# Patient Record
Sex: FEMALE | Race: ASIAN | NOT HISPANIC OR LATINO | URBAN - METROPOLITAN AREA
[De-identification: names, ages, dates, MRNs, and addresses within clinical notes are randomized per-mention and may not be internally consistent; named-entity substitution may affect disease eponyms.]

---

## 2017-01-18 ENCOUNTER — OUTPATIENT (OUTPATIENT)
Dept: OUTPATIENT SERVICES | Facility: HOSPITAL | Age: 47
LOS: 1 days | Discharge: HOME | End: 2017-01-18

## 2017-01-18 VITALS
OXYGEN SATURATION: 99 % | HEIGHT: 62 IN | SYSTOLIC BLOOD PRESSURE: 129 MMHG | TEMPERATURE: 99 F | DIASTOLIC BLOOD PRESSURE: 66 MMHG | RESPIRATION RATE: 18 BRPM | WEIGHT: 159.61 LBS | HEART RATE: 83 BPM

## 2017-01-18 DIAGNOSIS — S83.242A OTHER TEAR OF MEDIAL MENISCUS, CURRENT INJURY, LEFT KNEE, INITIAL ENCOUNTER: ICD-10-CM

## 2017-01-18 DIAGNOSIS — Z98.51 TUBAL LIGATION STATUS: Chronic | ICD-10-CM

## 2017-01-18 DIAGNOSIS — Z01.818 ENCOUNTER FOR OTHER PREPROCEDURAL EXAMINATION: ICD-10-CM

## 2017-01-18 DIAGNOSIS — M25.569 PAIN IN UNSPECIFIED KNEE: ICD-10-CM

## 2017-01-18 LAB — HCG UR QL: NEGATIVE — SIGNIFICANT CHANGE UP

## 2017-01-18 NOTE — H&P PST ADULT - NSANTHOSAYNRD_GEN_A_CORE
No. JEF screening performed.  STOP BANG Legend: 0-2 = LOW Risk; 3-4 = INTERMEDIATE Risk; 5-8 = HIGH Risk

## 2017-01-18 NOTE — ASU PATIENT PROFILE, ADULT - VISION (WITH CORRECTIVE LENSES IF THE PATIENT USUALLY WEARS THEM):
Use glasses/Partially impaired: cannot see medication labels or newsprint, but can see obstacles in path, and the surrounding layout; can count fingers at arm's length

## 2017-01-19 ENCOUNTER — RESULT REVIEW (OUTPATIENT)
Age: 47
End: 2017-01-19

## 2017-01-20 ENCOUNTER — OUTPATIENT (OUTPATIENT)
Dept: OUTPATIENT SERVICES | Facility: HOSPITAL | Age: 47
LOS: 1 days | Discharge: HOME | End: 2017-01-20
Payer: MEDICAID

## 2017-01-20 VITALS
HEART RATE: 72 BPM | DIASTOLIC BLOOD PRESSURE: 58 MMHG | SYSTOLIC BLOOD PRESSURE: 101 MMHG | OXYGEN SATURATION: 99 % | RESPIRATION RATE: 16 BRPM

## 2017-01-20 VITALS
HEIGHT: 62 IN | WEIGHT: 160.06 LBS | DIASTOLIC BLOOD PRESSURE: 67 MMHG | HEART RATE: 72 BPM | OXYGEN SATURATION: 100 % | TEMPERATURE: 98 F | SYSTOLIC BLOOD PRESSURE: 102 MMHG | RESPIRATION RATE: 17 BRPM

## 2017-01-20 DIAGNOSIS — M65.862 OTHER SYNOVITIS AND TENOSYNOVITIS, LEFT LOWER LEG: ICD-10-CM

## 2017-01-20 DIAGNOSIS — M23.322 OTHER MENISCUS DERANGEMENTS, POSTERIOR HORN OF MEDIAL MENISCUS, LEFT KNEE: ICD-10-CM

## 2017-01-20 DIAGNOSIS — Z98.51 TUBAL LIGATION STATUS: Chronic | ICD-10-CM

## 2017-01-20 PROCEDURE — 88304 TISSUE EXAM BY PATHOLOGIST: CPT | Mod: 26

## 2017-01-20 RX ORDER — MORPHINE SULFATE 50 MG/1
4 CAPSULE, EXTENDED RELEASE ORAL ONCE
Qty: 0 | Refills: 0 | Status: DISCONTINUED | OUTPATIENT
Start: 2017-01-20 | End: 2017-01-20

## 2017-01-20 RX ORDER — SODIUM CHLORIDE 9 MG/ML
1000 INJECTION, SOLUTION INTRAVENOUS
Qty: 0 | Refills: 0 | Status: DISCONTINUED | OUTPATIENT
Start: 2017-01-20 | End: 2017-02-04

## 2017-01-20 RX ORDER — ACETAMINOPHEN 500 MG
1000 TABLET ORAL ONCE
Qty: 0 | Refills: 0 | Status: COMPLETED | OUTPATIENT
Start: 2017-01-20 | End: 2017-01-20

## 2017-01-20 RX ORDER — SODIUM CHLORIDE 9 MG/ML
3 INJECTION INTRAMUSCULAR; INTRAVENOUS; SUBCUTANEOUS EVERY 8 HOURS
Qty: 0 | Refills: 0 | Status: DISCONTINUED | OUTPATIENT
Start: 2017-01-20 | End: 2017-01-20

## 2017-01-20 RX ADMIN — MORPHINE SULFATE 4 MILLIGRAM(S): 50 CAPSULE, EXTENDED RELEASE ORAL at 11:15

## 2017-01-20 RX ADMIN — Medication 1000 MILLIGRAM(S): at 11:14

## 2017-01-20 RX ADMIN — MORPHINE SULFATE 4 MILLIGRAM(S): 50 CAPSULE, EXTENDED RELEASE ORAL at 11:13

## 2017-01-20 RX ADMIN — SODIUM CHLORIDE 75 MILLILITER(S): 9 INJECTION, SOLUTION INTRAVENOUS at 11:13

## 2017-01-20 RX ADMIN — Medication 400 MILLIGRAM(S): at 11:12

## 2017-01-20 NOTE — BRIEF OPERATIVE NOTE - PROCEDURE
Meniscectomy of left knee  01/20/2017  Left medical meniscectomy and synovectomy  Active  [place] [id number]

## 2017-01-20 NOTE — ASU DISCHARGE PLAN (ADULT/PEDIATRIC). - NOTIFY
Bleeding that does not stop/Numbness, color, or temperature change to extremity/Persistent Nausea and Vomiting/Unable to Urinate/Pain not relieved by Medications/Fever greater than 101/Inability to Tolerate Liquids or Foods

## 2017-01-23 LAB — SURGICAL PATHOLOGY FINAL REPORT - CH: SIGNIFICANT CHANGE UP

## 2018-12-10 ENCOUNTER — EMERGENCY (EMERGENCY)
Facility: HOSPITAL | Age: 48
LOS: 1 days | Discharge: HOME | End: 2018-12-10
Admitting: EMERGENCY MEDICINE
Payer: COMMERCIAL

## 2018-12-10 VITALS
DIASTOLIC BLOOD PRESSURE: 81 MMHG | RESPIRATION RATE: 16 BRPM | HEART RATE: 83 BPM | OXYGEN SATURATION: 100 % | TEMPERATURE: 98 F | SYSTOLIC BLOOD PRESSURE: 143 MMHG

## 2018-12-10 VITALS
OXYGEN SATURATION: 100 % | DIASTOLIC BLOOD PRESSURE: 88 MMHG | SYSTOLIC BLOOD PRESSURE: 144 MMHG | HEART RATE: 78 BPM | RESPIRATION RATE: 18 BRPM

## 2018-12-10 DIAGNOSIS — Z98.51 TUBAL LIGATION STATUS: Chronic | ICD-10-CM

## 2018-12-10 LAB
BLD GP AB SCN SERPL QL: NEGATIVE — SIGNIFICANT CHANGE UP
BUN SERPL-MCNC: 8 MG/DL — SIGNIFICANT CHANGE UP (ref 7–23)
CALCIUM SERPL-MCNC: 9.2 MG/DL — SIGNIFICANT CHANGE UP (ref 8.4–10.5)
CHLORIDE SERPL-SCNC: 98 MMOL/L — SIGNIFICANT CHANGE UP (ref 98–107)
CO2 SERPL-SCNC: 25 MMOL/L — SIGNIFICANT CHANGE UP (ref 22–31)
CREAT SERPL-MCNC: 0.55 MG/DL — SIGNIFICANT CHANGE UP (ref 0.5–1.3)
GLUCOSE SERPL-MCNC: 162 MG/DL — HIGH (ref 70–99)
HCT VFR BLD CALC: 34.9 % — SIGNIFICANT CHANGE UP (ref 34.5–45)
HGB BLD-MCNC: 12 G/DL — SIGNIFICANT CHANGE UP (ref 11.5–15.5)
MCHC RBC-ENTMCNC: 27.3 PG — SIGNIFICANT CHANGE UP (ref 27–34)
MCHC RBC-ENTMCNC: 34.4 % — SIGNIFICANT CHANGE UP (ref 32–36)
MCV RBC AUTO: 79.3 FL — LOW (ref 80–100)
NRBC # FLD: 0.02 — SIGNIFICANT CHANGE UP
PLATELET # BLD AUTO: 329 K/UL — SIGNIFICANT CHANGE UP (ref 150–400)
PMV BLD: 9.6 FL — SIGNIFICANT CHANGE UP (ref 7–13)
POTASSIUM SERPL-MCNC: 4.1 MMOL/L — SIGNIFICANT CHANGE UP (ref 3.5–5.3)
POTASSIUM SERPL-SCNC: 4.1 MMOL/L — SIGNIFICANT CHANGE UP (ref 3.5–5.3)
RBC # BLD: 4.4 M/UL — SIGNIFICANT CHANGE UP (ref 3.8–5.2)
RBC # FLD: 17.3 % — HIGH (ref 10.3–14.5)
RH IG SCN BLD-IMP: POSITIVE — SIGNIFICANT CHANGE UP
SODIUM SERPL-SCNC: 137 MMOL/L — SIGNIFICANT CHANGE UP (ref 135–145)
WBC # BLD: 6.45 K/UL — SIGNIFICANT CHANGE UP (ref 3.8–10.5)
WBC # FLD AUTO: 6.45 K/UL — SIGNIFICANT CHANGE UP (ref 3.8–10.5)

## 2018-12-10 PROCEDURE — 99283 EMERGENCY DEPT VISIT LOW MDM: CPT

## 2018-12-10 NOTE — ED PROVIDER NOTE - PHYSICAL EXAMINATION
SKIN: LLE: 1 cm x 1 cm ulcerated area with 1 cm surrounding mild erythema, surrounding tender to palpation, no induration, no fluctuance, no bleeding, no discharge

## 2018-12-10 NOTE — ED ADULT NURSE NOTE - OBJECTIVE STATEMENT
pt presents mainly concerned over swollen wound to the left shin for which she has been treating with tumeric and aloe as a home remidy. pt also reports daily vaginal bleeding x one month, pt reports she has a contraceptive coil in that is supposed to be permanent however does not normally receive OBGYN care she goes to a clinic but not regularly. reports bleeding as passing clots and saturating 3-5 pads a day. pt denies syncope however reports she does feel light headed and appears pale. IV 20 g RAC labs sent TQ removed VSS at this time will CTM.

## 2018-12-10 NOTE — ED PROVIDER NOTE - MEDICAL DECISION MAKING DETAILS
48 year old female PMHx DM and HLD presents with LLE lesion, worsening surrounding pain and redness since 8 days and vaginal bleeding for 1 month. Notes history of anemia.  -labs - r/o anemia  -warm compresses, abx, pmd follow up within 48-72 hours

## 2018-12-10 NOTE — ED PROVIDER NOTE - OBJECTIVE STATEMENT
48 year old female with PMHx of DM and HLD presents to the ED c/o LLE lesion and surrounding, localized pain s/p a possible insect bite while working in the garden 8 days ago. Patient states that she initially noticed a blister in the area, popped it, applied neosporin, tumeric and aloe vera to the area. Patient reports pain and redness have worsened. Patient denies fever, chills, trauma or injury to the leg. Patient incidentally notes vaginal bleeding for one month as wel. Patient states vaginal bleeding has improved however she is still using 3 pads per day - states history of irregular heavy periods in the past (has never required transfusion - cannot recall previous hg values) . Patient reports history of anemia secondary to heavy vaginal bleeding, however denies history of transfusion. Patient denies abdominal pain, vaginal discharge, dysuria, hematuria, frequency, diarrhea or constipation.

## 2018-12-10 NOTE — ED ADULT NURSE NOTE - RESPIRATORY WDL
Initial Anesthesia Post-op Note    Patient: Rossy Buck  Procedure(s) Performed: EGD  Anesthesia type: Monitor Anesthesia Care      Last 24 I/O: No intake or output data in the 24 hours ending 11/20/17 1231    PATIENT LOCATION: PACU Phase 1  POST-OP VITAL SIGNS: stable  LEVEL OF CONSCIOUSNESS: sedated and responds to stimulation  RESPIRATORY STATUS: spontaneous ventilation  CARDIOVASCULAR: stable  HYDRATION: euvolemic    PAIN MANAGEMENT: adequately controlled  NAUSEA: None  AIRWAY PATENCY: patent  POST-OP ASSESSMENT: no complications and patient tolerated procedure well with no complications  COMPLICATIONS: none  HANDOFF:  Handoff to receiving nurse was performed and questions were answered      
Breathing spontaneous and unlabored. Breath sounds clear and equal bilaterally with regular rhythm.

## 2018-12-29 ENCOUNTER — EMERGENCY (EMERGENCY)
Facility: HOSPITAL | Age: 48
LOS: 1 days | Discharge: HOME | End: 2018-12-29
Attending: EMERGENCY MEDICINE | Admitting: EMERGENCY MEDICINE
Payer: COMMERCIAL

## 2018-12-29 VITALS
TEMPERATURE: 98 F | DIASTOLIC BLOOD PRESSURE: 77 MMHG | RESPIRATION RATE: 18 BRPM | OXYGEN SATURATION: 100 % | SYSTOLIC BLOOD PRESSURE: 126 MMHG | HEART RATE: 81 BPM

## 2018-12-29 VITALS
SYSTOLIC BLOOD PRESSURE: 140 MMHG | RESPIRATION RATE: 18 BRPM | HEART RATE: 90 BPM | OXYGEN SATURATION: 100 % | DIASTOLIC BLOOD PRESSURE: 92 MMHG

## 2018-12-29 DIAGNOSIS — Z98.51 TUBAL LIGATION STATUS: Chronic | ICD-10-CM

## 2018-12-29 LAB
ALBUMIN SERPL ELPH-MCNC: 4 G/DL — SIGNIFICANT CHANGE UP (ref 3.3–5)
ALP SERPL-CCNC: 78 U/L — SIGNIFICANT CHANGE UP (ref 40–120)
ALT FLD-CCNC: 26 U/L — SIGNIFICANT CHANGE UP (ref 4–33)
AST SERPL-CCNC: 28 U/L — SIGNIFICANT CHANGE UP (ref 4–32)
BASOPHILS # BLD AUTO: 0.04 K/UL — SIGNIFICANT CHANGE UP (ref 0–0.2)
BASOPHILS NFR BLD AUTO: 0.6 % — SIGNIFICANT CHANGE UP (ref 0–2)
BILIRUB SERPL-MCNC: 0.3 MG/DL — SIGNIFICANT CHANGE UP (ref 0.2–1.2)
BUN SERPL-MCNC: 9 MG/DL — SIGNIFICANT CHANGE UP (ref 7–23)
CALCIUM SERPL-MCNC: 8.7 MG/DL — SIGNIFICANT CHANGE UP (ref 8.4–10.5)
CHLORIDE SERPL-SCNC: 95 MMOL/L — LOW (ref 98–107)
CO2 SERPL-SCNC: 23 MMOL/L — SIGNIFICANT CHANGE UP (ref 22–31)
CREAT SERPL-MCNC: 0.5 MG/DL — SIGNIFICANT CHANGE UP (ref 0.5–1.3)
EOSINOPHIL # BLD AUTO: 0.37 K/UL — SIGNIFICANT CHANGE UP (ref 0–0.5)
EOSINOPHIL NFR BLD AUTO: 5.9 % — SIGNIFICANT CHANGE UP (ref 0–6)
GLUCOSE SERPL-MCNC: 170 MG/DL — HIGH (ref 70–99)
HCT VFR BLD CALC: 36.6 % — SIGNIFICANT CHANGE UP (ref 34.5–45)
HGB BLD-MCNC: 13.3 G/DL — SIGNIFICANT CHANGE UP (ref 11.5–15.5)
IMM GRANULOCYTES # BLD AUTO: 0.02 # — SIGNIFICANT CHANGE UP
IMM GRANULOCYTES NFR BLD AUTO: 0.3 % — SIGNIFICANT CHANGE UP (ref 0–1.5)
LIDOCAIN IGE QN: 47.8 U/L — SIGNIFICANT CHANGE UP (ref 7–60)
LYMPHOCYTES # BLD AUTO: 2.03 K/UL — SIGNIFICANT CHANGE UP (ref 1–3.3)
LYMPHOCYTES # BLD AUTO: 32.5 % — SIGNIFICANT CHANGE UP (ref 13–44)
MCHC RBC-ENTMCNC: 29.2 PG — SIGNIFICANT CHANGE UP (ref 27–34)
MCHC RBC-ENTMCNC: 36.3 % — HIGH (ref 32–36)
MCV RBC AUTO: 80.3 FL — SIGNIFICANT CHANGE UP (ref 80–100)
MONOCYTES # BLD AUTO: 0.27 K/UL — SIGNIFICANT CHANGE UP (ref 0–0.9)
MONOCYTES NFR BLD AUTO: 4.3 % — SIGNIFICANT CHANGE UP (ref 2–14)
NEUTROPHILS # BLD AUTO: 3.52 K/UL — SIGNIFICANT CHANGE UP (ref 1.8–7.4)
NEUTROPHILS NFR BLD AUTO: 56.4 % — SIGNIFICANT CHANGE UP (ref 43–77)
NRBC # FLD: 0 — SIGNIFICANT CHANGE UP
PLATELET # BLD AUTO: 356 K/UL — SIGNIFICANT CHANGE UP (ref 150–400)
PMV BLD: 10.4 FL — SIGNIFICANT CHANGE UP (ref 7–13)
POTASSIUM SERPL-MCNC: 4 MMOL/L — SIGNIFICANT CHANGE UP (ref 3.5–5.3)
POTASSIUM SERPL-SCNC: 4 MMOL/L — SIGNIFICANT CHANGE UP (ref 3.5–5.3)
PROT SERPL-MCNC: 6.6 G/DL — SIGNIFICANT CHANGE UP (ref 6–8.3)
RBC # BLD: 4.56 M/UL — SIGNIFICANT CHANGE UP (ref 3.8–5.2)
RBC # FLD: 16.1 % — HIGH (ref 10.3–14.5)
SODIUM SERPL-SCNC: 132 MMOL/L — LOW (ref 135–145)
TROPONIN T, HIGH SENSITIVITY: < 6 NG/L — SIGNIFICANT CHANGE UP (ref ?–14)
WBC # BLD: 6.25 K/UL — SIGNIFICANT CHANGE UP (ref 3.8–10.5)
WBC # FLD AUTO: 6.25 K/UL — SIGNIFICANT CHANGE UP (ref 3.8–10.5)

## 2018-12-29 PROCEDURE — 99053 MED SERV 10PM-8AM 24 HR FAC: CPT

## 2018-12-29 PROCEDURE — 71046 X-RAY EXAM CHEST 2 VIEWS: CPT | Mod: 26

## 2018-12-29 PROCEDURE — 93010 ELECTROCARDIOGRAM REPORT: CPT

## 2018-12-29 PROCEDURE — 99284 EMERGENCY DEPT VISIT MOD MDM: CPT | Mod: 25

## 2018-12-29 RX ORDER — ASPIRIN/CALCIUM CARB/MAGNESIUM 324 MG
162 TABLET ORAL ONCE
Qty: 0 | Refills: 0 | Status: COMPLETED | OUTPATIENT
Start: 2018-12-29 | End: 2018-12-29

## 2018-12-29 RX ORDER — IBUPROFEN 200 MG
600 TABLET ORAL ONCE
Qty: 0 | Refills: 0 | Status: COMPLETED | OUTPATIENT
Start: 2018-12-29 | End: 2018-12-29

## 2018-12-29 RX ORDER — ACETAMINOPHEN 500 MG
650 TABLET ORAL ONCE
Qty: 0 | Refills: 0 | Status: COMPLETED | OUTPATIENT
Start: 2018-12-29 | End: 2018-12-29

## 2018-12-29 RX ORDER — SODIUM CHLORIDE 9 MG/ML
1000 INJECTION INTRAMUSCULAR; INTRAVENOUS; SUBCUTANEOUS ONCE
Qty: 0 | Refills: 0 | Status: COMPLETED | OUTPATIENT
Start: 2018-12-29 | End: 2018-12-29

## 2018-12-29 RX ADMIN — SODIUM CHLORIDE 1000 MILLILITER(S): 9 INJECTION INTRAMUSCULAR; INTRAVENOUS; SUBCUTANEOUS at 08:14

## 2018-12-29 RX ADMIN — Medication 650 MILLIGRAM(S): at 08:14

## 2018-12-29 RX ADMIN — Medication 162 MILLIGRAM(S): at 07:57

## 2018-12-29 RX ADMIN — Medication 650 MILLIGRAM(S): at 09:15

## 2018-12-29 RX ADMIN — Medication 600 MILLIGRAM(S): at 09:29

## 2018-12-29 NOTE — ED ADULT TRIAGE NOTE - CHIEF COMPLAINT QUOTE
pt c/o constant left sided chest pain with sob since yesterday, +nausea no vomiting, appears comfortable in triage speaking in full sentences w/o difficulty spo2 100% on RA, denies long car rides/plane trips, pmh hld.

## 2018-12-29 NOTE — ED PROVIDER NOTE - ATTENDING CONTRIBUTION TO CARE
I was physically present for the E/M service provided. I agree with above history, physical, and plan which I have reviewed and edited where appropriate. I was physically present for the key portions of the service provided.    Attending Exam - Dr. Clancy: GEN: well appearing, NAD  HEENT: +PERRL, EOMI  RESP: CTAB, no signs of respiratory distress CV: s1s2 RRR ABD: soft/non tender/non distended  MSK: no deformities / swelling, normal range of motion, spine grossly normal NEURO: alert, non focal exam SKIN: normal color / temperature / condition.

## 2018-12-29 NOTE — ED PROVIDER NOTE - NSFOLLOWUPINSTRUCTIONS_ED_ALL_ED_FT
Follow up with your primary care provider within 48 hours, discuss scheduling a stress test  Take Motrin 600mg every 6 hours for pain, take with food  Drink plenty of fluids  Return to the ER with any worsening or concerning symptoms, increased chest pain, shortness of breath, weakness, nausea, fever/chills or any other concerns

## 2018-12-29 NOTE — ED PROVIDER NOTE - MEDICAL DECISION MAKING DETAILS
48yF w/pmhx DM, HLD p/w gradual onset left sided chest pain with mild shortness of breath that began yesterday in the setting of cough x 1 week. Normal EKG. Will check xray to r/o pneumonia, cbc/cmp to evaluate for anemia, high sensitivity troponin as pt with multiple risk factors for ACS. 48yF w/pmhx DM, HLD p/w gradual onset left sided chest pain with mild shortness of breath that began yesterday in the setting of cough x 1 week. Normal EKG. Will check xray to r/o pneumonia, cbc/cmp to evaluate for anemia, high sensitivity troponin as pt with multiple risk factors for ACS. PERC negative. Likely CDU for stress  DDx includes viral syndrome, pneumonia, ACS

## 2018-12-29 NOTE — ED PROVIDER NOTE - NS ED ATTENDING STATEMENT MOD
knee/left left/right/knee I have personally performed a face to face diagnostic evaluation on this patient. I have reviewed the ACP note and agree with the history, exam and plan of care, except as noted.

## 2018-12-29 NOTE — ED PROVIDER NOTE - CARDIAC, MLM
Normal rate, regular rhythm.  Heart sounds S1, S2.  Reproducible chest pain below left breast with palpation

## 2018-12-29 NOTE — ED ADULT NURSE REASSESSMENT NOTE - NS ED NURSE REASSESS COMMENT FT1
Pt complaining of chest pain non radiating 8/10, NSR on the monitor tylenol given as per MD orders. Pt denies shortness of breath, breathing even and unlabored. Will continue to monitor. Pt complaining of chest pain non radiating 8/10, NSR on the monitor tylenol given as per MD orders. Pt denies shortness of breath, breathing even and unlabored. IVL clear and patent, IVF infusing. Vital signs as noted. Will continue to monitor.

## 2018-12-29 NOTE — ED PROVIDER NOTE - PROGRESS NOTE DETAILS
SANTO Jeffery: Pt offered CDU for stress test and she prefers to follow up with her primary care Dr.Murray Barrera. Pt will schedule a stress test with her primary. Labs and imaging reviewed, discharge discussed with .

## 2018-12-29 NOTE — ED PROVIDER NOTE - OBJECTIVE STATEMENT
48yF w/pmhx DM, HLD presenting with chest pain. Pt states she has had a cough for he past week, yesterday she developed gradual onset left sided chest pain, below her left breast with radiation to her back. Pt states the pain is a 7/10,  described as pressure like/tightness, constant since onset, worse with deep inspiration and certain positional changes. Associated she has mild shortness of breath, nausea and sweating. Pts  has also had a cough for a few weeks. Pt denies fever/chills, abdominal pain, vomiting, diarrhea, headache, dizziness, numbness/tingling, weakness, dizziness, near syncope, recent travel leg pain or swelling, hx DVT/PE or any other concerns. Of note pt was seen in the ER on 12/10 for left leg cellulitis from possible bug bite.   Family hx Mother MI in 60s 48yF w/pmhx DM, HLD presenting with chest pain. Pt states she has had a cough for he past week, yesterday she developed gradual onset left sided chest pain, below her left breast with radiation to her back. Pt states the pain is a 7/10,  described as pressure like/tightness, constant since onset, worse with deep inspiration and certain positional changes. Associated she has mild shortness of breath, nausea and sweating. Pts  has also had a cough for a few weeks. Pt denies fever/chills, abdominal pain, vomiting, diarrhea, headache, dizziness, numbness/tingling, weakness, dizziness, near syncope, recent travel leg pain or swelling, hx DVT/PE or any other concerns. Of note pt was seen in the ER on 12/10 for left leg cellulitis from possible bug bite.   Family hx Mother MI in 60s, last stress test was normal 5 years ago, no cardiologist

## 2018-12-29 NOTE — ED ADULT NURSE NOTE - NSIMPLEMENTINTERV_GEN_ALL_ED
Implemented All Universal Safety Interventions:  [place] to call system. Call bell, personal items and telephone within reach. Instruct patient to call for assistance. Room bathroom lighting operational. Non-slip footwear when patient is off stretcher. Physically safe environment: no spills, clutter or unnecessary equipment. Stretcher in lowest position, wheels locked, appropriate side rails in place.

## 2018-12-29 NOTE — ED ADULT NURSE NOTE - OBJECTIVE STATEMENT
rec'd pt. a&ox4, with hx of HLD and DM, came in c/o left sided chest pain radiating to the back since yesterday. as per pt, it feels like pressure on chest started yesterday afternoon after she ate. pain worsens when she takes deep breaths. pt. also c/o SOB and feels nauseated, no vomiting. pt. also c/o productive cough (yellowish phlegm) x 1 week, denies any fever nor sick contacts. pt. is NSR on cardiac monitor. labs sent. seen by PA. will continue to monitor

## 2018-12-30 PROBLEM — E11.9 TYPE 2 DIABETES MELLITUS WITHOUT COMPLICATIONS: Chronic | Status: ACTIVE | Noted: 2018-12-10

## 2019-07-03 ENCOUNTER — EMERGENCY (EMERGENCY)
Facility: HOSPITAL | Age: 49
LOS: 1 days | Discharge: HOME | End: 2019-07-03
Attending: EMERGENCY MEDICINE
Payer: COMMERCIAL

## 2019-07-03 VITALS
HEIGHT: 62 IN | SYSTOLIC BLOOD PRESSURE: 137 MMHG | OXYGEN SATURATION: 96 % | RESPIRATION RATE: 18 BRPM | HEART RATE: 103 BPM | WEIGHT: 149.91 LBS | DIASTOLIC BLOOD PRESSURE: 84 MMHG | TEMPERATURE: 98 F

## 2019-07-03 DIAGNOSIS — Z98.51 TUBAL LIGATION STATUS: Chronic | ICD-10-CM

## 2019-07-03 PROCEDURE — 73110 X-RAY EXAM OF WRIST: CPT | Mod: 26,RT

## 2019-07-03 PROCEDURE — 73110 X-RAY EXAM OF WRIST: CPT

## 2019-07-03 PROCEDURE — 73140 X-RAY EXAM OF FINGER(S): CPT | Mod: 26,RT

## 2019-07-03 PROCEDURE — 73140 X-RAY EXAM OF FINGER(S): CPT

## 2019-07-03 PROCEDURE — 29125 APPL SHORT ARM SPLINT STATIC: CPT

## 2019-07-03 PROCEDURE — 99284 EMERGENCY DEPT VISIT MOD MDM: CPT | Mod: 25

## 2019-07-03 PROCEDURE — 29125 APPL SHORT ARM SPLINT STATIC: CPT | Mod: RT

## 2019-07-03 RX ORDER — ACETAMINOPHEN 500 MG
975 TABLET ORAL ONCE
Refills: 0 | Status: COMPLETED | OUTPATIENT
Start: 2019-07-03 | End: 2019-07-03

## 2019-07-03 RX ADMIN — Medication 975 MILLIGRAM(S): at 21:59

## 2019-07-03 NOTE — ED STATDOCS - PROGRESS NOTE DETAILS
PA NOTE: PT evaluated by intake physician. HPI/PE/ROS as noted above. Will follow up plan per intake physician PA NOTE: Xrays show fracture of the distal radial head, spoke with ortho who advised that the fracture does not need to be reduced, splinted with sugar tong splint and f/u with ortho, also applied splint to fractur eof 5th right digit and 2nd right digit. Pt reassessed, pt feeling better at this time, pain has improved, vss, pt able to walk, talk and vocalized plan of action. Discussed in depth the results and findings that were performed in the ED and explained to pt in depth the next steps that need to be taking including any medications and proper follow up with PCP or specialists. Pt verbalized their concerns and all questions were answered. Pt understands dispo and agrees with discharge.

## 2019-07-03 NOTE — ED STATDOCS - ATTENDING CONTRIBUTION TO CARE
I, Margarita Florence, performed the initial face to face bedside interview with this patient regarding history of present illness, review of symptoms and relevant past medical, social and family history.  I completed an independent physical examination.  I was the initial provider who evaluated this patient. I have signed out the follow up of any pending tests (i.e. labs, radiological studies) to the ACP.  I have communicated the patient’s plan of care and disposition with the ACP.

## 2019-07-03 NOTE — ED STATDOCS - NS ED ROS FT
Const: Denies fever, chills  HEENT: Denies blurry vision, sore throat  Neck: Denies neck pain/stiffness  Resp: Denies coughing, SOB  Cardiovascular: Denies CP, palpitations, LE edema  GI: Denies nausea, vomiting, abdominal pain, diarrhea, constipation, blood in stool  : Denies urinary frequency/urgency/dysuria, hematuria  MSK: Denies back pain, + Right wrist pain  Neuro: Denies HA, dizziness, numbness, weakness  Skin: Denies rashes.

## 2019-07-03 NOTE — ED STATDOCS - PHYSICAL EXAMINATION
Const: Awake, alert and oriented. In no acute distress. Well appearing.  HEENT: NC/AT. Moist mucous membranes.  Eyes: No scleral icterus. EOMI.  Neck:. Soft and supple. Full ROM without pain.  Cardiac: Tachycardic rate and rhythm. +S1/S2. No murmurs. Radial pulse 2+ and symmetric. No LE edema. Cap refill <2 seconds in right wrist and left index finger.  Resp: Speaking in full sentences. No evidence of respiratory distress. No wheezes, rales or rhonchi.  Abd: Soft, non-tender, non-distended. Normal bowel sounds in all 4 quadrants. No guarding or rebound.  Back: Spine midline and non-tender. No CVAT.   Extremities: TTP over right distal radial head, mild deformity noted, + TTP DIP on right 5th digit.  Skin: Ecchymosis noted to PIP of left 2nd digit.  Lymph: No cervical lymphadenopathy.  Neuro: Awake, alert & oriented x 3. Moves all extremities symmetrically.
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
## 2019-07-03 NOTE — ED PROCEDURE NOTE - ATTENDING CONTRIBUTION TO CARE
I, Margarita Florence, independently evaluated the patient and discussed the procedure with the PA. I evaluated the patient prior to and after the procedure and the patient tolerated the procedure well. I discussed indications to return to the ED and the importance of proper follow up and patient verbalizes understanding.

## 2019-07-03 NOTE — ED PROVIDER NOTE - PROGRESS NOTE DETAILS
PA NOTE: PT evaluated by intake physician. HPI/PE/ROS as noted above. Will follow up plan per intake physician Xrays show fracture of the distal radial head, spoke with ortho who advised that the fracture does not need to be reduced, splinted with sugar tong splint and f/u with ortho, also applied splint to fractur eof 5th right digit and 2nd right digit. Pt reassessed, pt feeling better at this time, pain has improved, vss, pt able to walk, talk and vocalized plan of action. Discussed in depth the results and findings that were performed in the ED and explained to pt in depth the next steps that need to be taking including any medications and proper follow up with PCP or specialists. Pt verbalized their concerns and all questions were answered. Pt understands dispo and agrees with discharge.

## 2020-01-25 ENCOUNTER — INPATIENT (INPATIENT)
Facility: HOSPITAL | Age: 50
LOS: 4 days | Discharge: HOME | End: 2020-01-30
Attending: HOSPITALIST | Admitting: HOSPITALIST
Payer: MEDICAID

## 2020-01-25 VITALS
SYSTOLIC BLOOD PRESSURE: 97 MMHG | OXYGEN SATURATION: 100 % | HEART RATE: 72 BPM | TEMPERATURE: 98 F | RESPIRATION RATE: 16 BRPM | DIASTOLIC BLOOD PRESSURE: 64 MMHG

## 2020-01-25 DIAGNOSIS — R56.9 UNSPECIFIED CONVULSIONS: ICD-10-CM

## 2020-01-25 DIAGNOSIS — E11.9 TYPE 2 DIABETES MELLITUS WITHOUT COMPLICATIONS: ICD-10-CM

## 2020-01-25 DIAGNOSIS — Z98.51 TUBAL LIGATION STATUS: Chronic | ICD-10-CM

## 2020-01-25 DIAGNOSIS — Z02.9 ENCOUNTER FOR ADMINISTRATIVE EXAMINATIONS, UNSPECIFIED: ICD-10-CM

## 2020-01-25 DIAGNOSIS — Z29.9 ENCOUNTER FOR PROPHYLACTIC MEASURES, UNSPECIFIED: ICD-10-CM

## 2020-01-25 DIAGNOSIS — D64.9 ANEMIA, UNSPECIFIED: ICD-10-CM

## 2020-01-25 LAB
ALBUMIN SERPL ELPH-MCNC: 4 G/DL — SIGNIFICANT CHANGE UP (ref 3.3–5)
ALP SERPL-CCNC: 94 U/L — SIGNIFICANT CHANGE UP (ref 40–120)
ALT FLD-CCNC: 17 U/L — SIGNIFICANT CHANGE UP (ref 4–33)
AMPHET UR-MCNC: NEGATIVE — SIGNIFICANT CHANGE UP
ANION GAP SERPL CALC-SCNC: 12 MMO/L — SIGNIFICANT CHANGE UP (ref 7–14)
APAP SERPL-MCNC: < 15 UG/ML — LOW (ref 15–25)
APPEARANCE UR: CLEAR — SIGNIFICANT CHANGE UP
AST SERPL-CCNC: 21 U/L — SIGNIFICANT CHANGE UP (ref 4–32)
BACTERIA # UR AUTO: NEGATIVE — SIGNIFICANT CHANGE UP
BARBITURATES UR SCN-MCNC: NEGATIVE — SIGNIFICANT CHANGE UP
BASE EXCESS BLDV CALC-SCNC: -0.5 MMOL/L — SIGNIFICANT CHANGE UP
BASOPHILS # BLD AUTO: 0.02 K/UL — SIGNIFICANT CHANGE UP (ref 0–0.2)
BASOPHILS NFR BLD AUTO: 0.3 % — SIGNIFICANT CHANGE UP (ref 0–2)
BENZODIAZ UR-MCNC: NEGATIVE — SIGNIFICANT CHANGE UP
BILIRUB SERPL-MCNC: 0.7 MG/DL — SIGNIFICANT CHANGE UP (ref 0.2–1.2)
BILIRUB UR-MCNC: NEGATIVE — SIGNIFICANT CHANGE UP
BLOOD GAS VENOUS - CREATININE: 0.56 MG/DL — SIGNIFICANT CHANGE UP (ref 0.5–1.3)
BLOOD UR QL VISUAL: HIGH
BUN SERPL-MCNC: 7 MG/DL — SIGNIFICANT CHANGE UP (ref 7–23)
CALCIUM SERPL-MCNC: 8.8 MG/DL — SIGNIFICANT CHANGE UP (ref 8.4–10.5)
CANNABINOIDS UR-MCNC: NEGATIVE — SIGNIFICANT CHANGE UP
CHLORIDE BLDV-SCNC: 107 MMOL/L — SIGNIFICANT CHANGE UP (ref 96–108)
CHLORIDE SERPL-SCNC: 101 MMOL/L — SIGNIFICANT CHANGE UP (ref 98–107)
CK MB BLD-MCNC: 1 — SIGNIFICANT CHANGE UP (ref 0–2.5)
CK MB BLD-MCNC: 1.58 NG/ML — SIGNIFICANT CHANGE UP (ref 1–4.7)
CK SERPL-CCNC: 172 U/L — HIGH (ref 25–170)
CK SERPL-CCNC: 172 U/L — HIGH (ref 25–170)
CO2 SERPL-SCNC: 22 MMOL/L — SIGNIFICANT CHANGE UP (ref 22–31)
COCAINE METAB.OTHER UR-MCNC: NEGATIVE — SIGNIFICANT CHANGE UP
COLOR SPEC: COLORLESS — SIGNIFICANT CHANGE UP
CREAT SERPL-MCNC: 0.53 MG/DL — SIGNIFICANT CHANGE UP (ref 0.5–1.3)
EOSINOPHIL # BLD AUTO: 0.04 K/UL — SIGNIFICANT CHANGE UP (ref 0–0.5)
EOSINOPHIL NFR BLD AUTO: 0.5 % — SIGNIFICANT CHANGE UP (ref 0–6)
ETHANOL BLD-MCNC: < 10 MG/DL — SIGNIFICANT CHANGE UP
GAS PNL BLDV: 135 MMOL/L — LOW (ref 136–146)
GLUCOSE BLDV-MCNC: 163 MG/DL — HIGH (ref 70–99)
GLUCOSE SERPL-MCNC: 163 MG/DL — HIGH (ref 70–99)
GLUCOSE UR-MCNC: NEGATIVE — SIGNIFICANT CHANGE UP
HCG UR-SCNC: NEGATIVE — SIGNIFICANT CHANGE UP
HCO3 BLDV-SCNC: 23 MMOL/L — SIGNIFICANT CHANGE UP (ref 20–27)
HCT VFR BLD CALC: 29.1 % — LOW (ref 34.5–45)
HCT VFR BLDV CALC: 25 % — LOW (ref 34.5–45)
HGB BLD-MCNC: 8.6 G/DL — LOW (ref 11.5–15.5)
HGB BLDV-MCNC: 8 G/DL — LOW (ref 11.5–15.5)
HYALINE CASTS # UR AUTO: NEGATIVE — SIGNIFICANT CHANGE UP
IMM GRANULOCYTES NFR BLD AUTO: 0.3 % — SIGNIFICANT CHANGE UP (ref 0–1.5)
KETONES UR-MCNC: NEGATIVE — SIGNIFICANT CHANGE UP
LACTATE BLDV-MCNC: 2.7 MMOL/L — HIGH (ref 0.5–2)
LEUKOCYTE ESTERASE UR-ACNC: NEGATIVE — SIGNIFICANT CHANGE UP
LYMPHOCYTES # BLD AUTO: 1 K/UL — SIGNIFICANT CHANGE UP (ref 1–3.3)
LYMPHOCYTES # BLD AUTO: 13.1 % — SIGNIFICANT CHANGE UP (ref 13–44)
MCHC RBC-ENTMCNC: 21.5 PG — LOW (ref 27–34)
MCHC RBC-ENTMCNC: 29.6 % — LOW (ref 32–36)
MCV RBC AUTO: 72.8 FL — LOW (ref 80–100)
METHADONE UR-MCNC: NEGATIVE — SIGNIFICANT CHANGE UP
MONOCYTES # BLD AUTO: 0.33 K/UL — SIGNIFICANT CHANGE UP (ref 0–0.9)
MONOCYTES NFR BLD AUTO: 4.3 % — SIGNIFICANT CHANGE UP (ref 2–14)
NEUTROPHILS # BLD AUTO: 6.21 K/UL — SIGNIFICANT CHANGE UP (ref 1.8–7.4)
NEUTROPHILS NFR BLD AUTO: 81.5 % — HIGH (ref 43–77)
NITRITE UR-MCNC: NEGATIVE — SIGNIFICANT CHANGE UP
NRBC # FLD: 0 K/UL — SIGNIFICANT CHANGE UP (ref 0–0)
OPIATES UR-MCNC: NEGATIVE — SIGNIFICANT CHANGE UP
OXYCODONE UR-MCNC: NEGATIVE — SIGNIFICANT CHANGE UP
PCO2 BLDV: 51 MMHG — SIGNIFICANT CHANGE UP (ref 41–51)
PCP UR-MCNC: NEGATIVE — SIGNIFICANT CHANGE UP
PH BLDV: 7.31 PH — LOW (ref 7.32–7.43)
PH UR: 6.5 — SIGNIFICANT CHANGE UP (ref 5–8)
PLATELET # BLD AUTO: 311 K/UL — SIGNIFICANT CHANGE UP (ref 150–400)
PMV BLD: 10.4 FL — SIGNIFICANT CHANGE UP (ref 7–13)
PO2 BLDV: 40 MMHG — SIGNIFICANT CHANGE UP (ref 35–40)
POTASSIUM BLDV-SCNC: 3.8 MMOL/L — SIGNIFICANT CHANGE UP (ref 3.4–4.5)
POTASSIUM SERPL-MCNC: 4.4 MMOL/L — SIGNIFICANT CHANGE UP (ref 3.5–5.3)
POTASSIUM SERPL-SCNC: 4.4 MMOL/L — SIGNIFICANT CHANGE UP (ref 3.5–5.3)
PROT SERPL-MCNC: 6.9 G/DL — SIGNIFICANT CHANGE UP (ref 6–8.3)
PROT UR-MCNC: NEGATIVE — SIGNIFICANT CHANGE UP
RBC # BLD: 4 M/UL — SIGNIFICANT CHANGE UP (ref 3.8–5.2)
RBC # FLD: 16.6 % — HIGH (ref 10.3–14.5)
RBC CASTS # UR COMP ASSIST: SIGNIFICANT CHANGE UP (ref 0–?)
SALICYLATES SERPL-MCNC: < 5 MG/DL — LOW (ref 15–30)
SAO2 % BLDV: 62.1 % — SIGNIFICANT CHANGE UP (ref 60–85)
SODIUM SERPL-SCNC: 135 MMOL/L — SIGNIFICANT CHANGE UP (ref 135–145)
SP GR SPEC: 1.01 — SIGNIFICANT CHANGE UP (ref 1–1.04)
SP GR UR: 1 — SIGNIFICANT CHANGE UP (ref 1–1.04)
SQUAMOUS # UR AUTO: SIGNIFICANT CHANGE UP
TROPONIN T, HIGH SENSITIVITY: < 6 NG/L — SIGNIFICANT CHANGE UP (ref ?–14)
UROBILINOGEN FLD QL: NORMAL — SIGNIFICANT CHANGE UP
WBC # BLD: 7.62 K/UL — SIGNIFICANT CHANGE UP (ref 3.8–10.5)
WBC # FLD AUTO: 7.62 K/UL — SIGNIFICANT CHANGE UP (ref 3.8–10.5)
WBC UR QL: SIGNIFICANT CHANGE UP (ref 0–?)

## 2020-01-25 PROCEDURE — 99223 1ST HOSP IP/OBS HIGH 75: CPT | Mod: GC

## 2020-01-25 PROCEDURE — 70498 CT ANGIOGRAPHY NECK: CPT | Mod: 26

## 2020-01-25 PROCEDURE — 70496 CT ANGIOGRAPHY HEAD: CPT | Mod: 26

## 2020-01-25 PROCEDURE — 71045 X-RAY EXAM CHEST 1 VIEW: CPT | Mod: 26

## 2020-01-25 PROCEDURE — 72125 CT NECK SPINE W/O DYE: CPT | Mod: 26

## 2020-01-25 RX ORDER — OMEGA-3 ACID ETHYL ESTERS 1 G
1 CAPSULE ORAL
Qty: 0 | Refills: 0 | DISCHARGE

## 2020-01-25 RX ORDER — LEVETIRACETAM 250 MG/1
1000 TABLET, FILM COATED ORAL ONCE
Refills: 0 | Status: DISCONTINUED | OUTPATIENT
Start: 2020-01-25 | End: 2020-01-25

## 2020-01-25 RX ORDER — ASPIRIN/CALCIUM CARB/MAGNESIUM 324 MG
1 TABLET ORAL
Qty: 0 | Refills: 0 | DISCHARGE

## 2020-01-25 RX ORDER — OMEGA-3 ACID ETHYL ESTERS 1 G
2 CAPSULE ORAL
Refills: 0 | Status: DISCONTINUED | OUTPATIENT
Start: 2020-01-25 | End: 2020-01-30

## 2020-01-25 RX ORDER — ASPIRIN/CALCIUM CARB/MAGNESIUM 324 MG
81 TABLET ORAL DAILY
Refills: 0 | Status: DISCONTINUED | OUTPATIENT
Start: 2020-01-25 | End: 2020-01-30

## 2020-01-25 RX ORDER — ERGOCALCIFEROL 1.25 MG/1
1 CAPSULE ORAL
Qty: 0 | Refills: 0 | DISCHARGE

## 2020-01-25 RX ORDER — ATORVASTATIN CALCIUM 80 MG/1
1 TABLET, FILM COATED ORAL
Qty: 0 | Refills: 0 | DISCHARGE

## 2020-01-25 RX ORDER — INFLUENZA VIRUS VACCINE 15; 15; 15; 15 UG/.5ML; UG/.5ML; UG/.5ML; UG/.5ML
0.5 SUSPENSION INTRAMUSCULAR ONCE
Refills: 0 | Status: DISCONTINUED | OUTPATIENT
Start: 2020-01-25 | End: 2020-01-30

## 2020-01-25 RX ORDER — CHOLECALCIFEROL (VITAMIN D3) 125 MCG
1 CAPSULE ORAL
Qty: 0 | Refills: 0 | DISCHARGE

## 2020-01-25 RX ORDER — SODIUM CHLORIDE 9 MG/ML
1000 INJECTION, SOLUTION INTRAVENOUS ONCE
Refills: 0 | Status: COMPLETED | OUTPATIENT
Start: 2020-01-25 | End: 2020-01-25

## 2020-01-25 RX ORDER — SODIUM CHLORIDE 9 MG/ML
1000 INJECTION, SOLUTION INTRAVENOUS
Refills: 0 | Status: DISCONTINUED | OUTPATIENT
Start: 2020-01-25 | End: 2020-01-26

## 2020-01-25 RX ORDER — ACETAMINOPHEN 500 MG
650 TABLET ORAL ONCE
Refills: 0 | Status: COMPLETED | OUTPATIENT
Start: 2020-01-25 | End: 2020-01-25

## 2020-01-25 RX ADMIN — Medication 2 GRAM(S): at 17:42

## 2020-01-25 RX ADMIN — SODIUM CHLORIDE 100 MILLILITER(S): 9 INJECTION, SOLUTION INTRAVENOUS at 18:33

## 2020-01-25 RX ADMIN — Medication 1 TABLET(S): at 17:41

## 2020-01-25 RX ADMIN — Medication 650 MILLIGRAM(S): at 14:57

## 2020-01-25 RX ADMIN — SODIUM CHLORIDE 1000 MILLILITER(S): 9 INJECTION, SOLUTION INTRAVENOUS at 07:49

## 2020-01-25 RX ADMIN — SODIUM CHLORIDE 250 MILLILITER(S): 9 INJECTION, SOLUTION INTRAVENOUS at 12:30

## 2020-01-25 RX ADMIN — Medication 81 MILLIGRAM(S): at 17:42

## 2020-01-25 NOTE — ED ADULT NURSE NOTE - OBJECTIVE STATEMENT
Pt presents to  5 A&O4 accompanied by daughter who witnessed pt having seizure. Pt daughter states she heard loud bang then found mother lying in bathroom floor actively seizing, head against wall. Pt states she felt dizzy/weak then passed out. Pt daughter states she was on side, vomited and soiled herself. Pt currently awake, alert and oriented, remembers waking up on floor following seizure. Pt with 20g IV placed to L hand by EMS, with 1L NS hanging. Pt currently denies any dizziness, lightheadedness, endorses head pain but denies headache, states "my head feels numb/sore." Pt denies any Hx of seizures, states over 10years ago passed out and was told she has mitral valve prolapse, no further action was performed. Pt with no other pertinent medical Hx. Pt able to move all extremities, no facial droop, speaking coherently, and no limb drift. Pt denies any SOB or chest pain, respirations even and unlabored and appears to be resting comfortably. Bed in lowest position with siderails up, daughter at bedside. Pt currently on cardiac monitor, in NSR, vitally stable and in no obvious distress. Pt presents to  5 A&O4 accompanied by daughter who witnessed pt having seizure. Pt daughter states she heard loud bang then found mother lying in bathroom floor actively seizing, head against wall. Pt states she felt dizzy/weak then passed out. Pt daughter states she was on side, vomited and soiled herself. Pt currently awake, alert and oriented, remembers waking up on floor following seizure. Pt with 20g IV placed to L hand by EMS, with 1L NS hanging. Pt currently denies any dizziness, lightheadedness, endorses head pain but denies headache, states "my head feels numb/sore." Pt denies any Hx of seizures, states over 10years ago passed out and was told she has mitral valve prolapse, no further action was performed. Pt with no other pertinent medical Hx. Pt able to move all extremities, no facial droop, speaking coherently, and no limb drift. Pt denies any SOB or chest pain, respirations even and unlabored and appears to be resting comfortably. Bed in lowest position with siderails up, daughter at bedside. Labs drawn and sent. Pt currently on cardiac monitor, in NSR, vitally stable and in no obvious distress.

## 2020-01-25 NOTE — ED PROVIDER NOTE - CROS ED RESP ALL NEG
1. Follow up in 6 months.   2. Blood labs at that appointment. Urine today  3. If you have any issues you can email me.    negative...

## 2020-01-25 NOTE — CONSULT NOTE ADULT - SUBJECTIVE AND OBJECTIVE BOX
HPI:    49F pmhx hypotension, syncope, prior episode of fecal incontinence w syncope, not on any meds pw witnessed seizure by daughter who is an EMT.  Patient was in bathroom and LOC, no memory of event.  Daughter heard patient fall and ran in to find her with both hands shaking, tongue out of mouth, head turned to the left, and banging head against the wall she had fallen against.  Daughter called 911 and patient was "confused and not speaking clearly" for approximately one hour post ictal.   No fever, n/v/d/c, chest / abd pain, cough, sob, dizziness, dysuria/hematuria. No recent travel, medication change, illness, or hospitalization.  Patient now at baseline.    NIHSS 0  LKN: 7 AM      REVIEW OF SYSTEMS  General:	  Skin/Breast:	  Ophthalmologic:  ENMT:	  Respiratory and Thorax:	  Cardiovascular:	  Gastrointestinal:	  Genitourinary:	  Musculoskeletal:	  Neurological:	  Psychiatric:	  Hematology/Lymphatics:	  Endocrine:	  Allergic/Immunologic:	    A 10-system ROS was performed and is negative except for those items noted above and/or in the HPI.    PAST MEDICAL & SURGICAL HISTORY:  No pertinent past medical history  DM (diabetes mellitus)  Dyslipidemia  S/P tubal ligation: 2011    FAMILY HISTORY:  Family history of hypertension (Mother)    SOCIAL HISTORY:   T/E/D:   Occupation:   Lives with:     MEDICATIONS (HOME):  Home Medications:  aspirin 81 mg oral tablet: 1 tab(s) orally once a day (20 Jan 2017 09:40)  atorvastatin 20 mg oral tablet: 1 tab(s) orally once a day (20 Jan 2017 09:40)  Vitamin D2: 1 cap(s) orally once a week (20 Jan 2017 09:40)    MEDICATIONS  (STANDING):    MEDICATIONS  (PRN):    ALLERGIES/INTOLERANCES:  Allergies  No Known Allergies    Intolerances    VITALS & EXAMINATION:  Vital Signs Last 24 Hrs  T(C): 36.7 (25 Jan 2020 07:45), Max: 36.7 (25 Jan 2020 07:45)  T(F): 98.1 (25 Jan 2020 07:45), Max: 98.1 (25 Jan 2020 07:45)  HR: 72 (25 Jan 2020 07:45) (72 - 72)  BP: 112/66 (25 Jan 2020 07:45) (97/64 - 112/66)  BP(mean): --  RR: 16 (25 Jan 2020 07:45) (16 - 16)  SpO2: 100% (25 Jan 2020 07:45) (100% - 100%)    Neurological Exam    Mental Status:  alert and oriented x3, fluent speech, following commands, repetition and naming intact    Cranial Nerves: PERRL, EOMI without nystagmus, visual fields intact, V 1-3 intact, no facial droop, pallate and uvula midline, no dysarthria, head turn strength normal, shoulder shrug strength normal.    Motor:   Tone:   normal                 Strength:    Upper Extremity    De L 5/5 R 5/5  Bi L 5/5 R 5/5  Tr L 5/5 R 5/5  Br L 5/5 R 5/5    Lower extremity    HF L 5/5 R 5/5  KE L 5/5 R 5/5  KF L 5/5 R 5/5  DF L 5/5 R 5/5  PF L 5/5 R 5/5    Pronator drift: none            Sensation: intact grossly to light touch    Tremor: none appreciated at rest or in action    Deep Tendon Reflexes: 2+ throughout except Patella absent both legs    Toes flexor bilaterally: mute    Dysmetria: FTN intact b/l      LABORATORY:  CBC                       8.6    7.62  )-----------( 311      ( 25 Jan 2020 06:00 )             29.1     Chem 01-25    135  |  101  |  7   ----------------------------<  163<H>  4.4   |  22  |  0.53    Ca    8.8      25 Jan 2020 06:00    TPro  6.9  /  Alb  4.0  /  TBili  0.7  /  DBili  x   /  AST  21  /  ALT  17  /  AlkPhos  94  01-25    LFTs LIVER FUNCTIONS - ( 25 Jan 2020 06:00 )  Alb: 4.0 g/dL / Pro: 6.9 g/dL / ALK PHOS: 94 u/L / ALT: 17 u/L / AST: 21 u/L / GGT: x           Coagulopathy   Lipid Panel   A1c   Cardiac enzymes CARDIAC MARKERS ( 25 Jan 2020 07:43 )  x     / x     / 172 u/L / x     / x      CARDIAC MARKERS ( 25 Jan 2020 06:00 )  x     / x     / 172 u/L / 1.58 ng/mL / x          U/A   CSF  Immunological  Other    STUDIES & IMAGING:  Studies (EKG, EEG, EMG, etc):     Radiology (XR, CT, MR, U/S, TTE/CHAR):
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
## 2020-01-25 NOTE — PATIENT PROFILE ADULT - VISION (WITH CORRECTIVE LENSES IF THE PATIENT USUALLY WEARS THEM):
Patient's family is calling for results form last scan. Please call to advise.   Normal vision: sees adequately in most situations; can see medication labels, newsprint

## 2020-01-25 NOTE — ED PROVIDER NOTE - OBJECTIVE STATEMENT
49F w/out pmh, not on any meds - bib ems for 49F w/out pmh, not on any meds - bib ems for fall. Patient was at home, woke up to go to bathroom around 3am, pt's daughter heard her fall while in the bathroom and went to check on her - pt was found on ground and disoriented, then 1 minute later daughter saw patient start rhythmically turning head to left side, also with mild diffuse tremor. pt was then disoriented x 30 min, then went back to normal over next 1 hr. here reports is at baseline. No fever, n/v/d/c, chest / abd pain, cough, sob, dizziness, dysuria/hematuria. No recent travel, medication change, illness, or hospitalization.

## 2020-01-25 NOTE — H&P ADULT - NSHPREVIEWOFSYSTEMS_GEN_ALL_CORE
General: denies fever, chills  HENT: denies nasal congestion, sore throat, rhinorrhea, ear pain  Eyes: denies visual changes, blurred vision, eye discharge, eye redness  Neck: denies neck margarita  CV: denies chest pain, palpitations  Resp: denies difficulty breathing, cough  Abdominal: +vomiting denies nausea, diarrhea, abdominal pain, blood in stool, dark stool  MSK: denies muscle aches  Neuro: denies headaches, numbness, tingling, dizziness, lightheadedness.  Skin: denies rashes, cuts, bruises  Hematologic: denies unexplained bruises General: denies fever, chills  HENT: denies nasal congestion, sore throat, rhinorrhea, ear pain  Eyes: denies visual changes, blurred vision, eye discharge, eye redness  Neck: denies neck margarita  CV: denies chest pain, palpitations  Resp: denies difficulty breathing, cough  Abdominal: +vomiting denies nausea, diarrhea, abdominal pain, blood in stool, dark stool  MSK: denies muscle aches  Neuro: denies headaches, numbness, tingling, dizziness, lightheadedness  Skin: denies rashes, cuts, bruises  Hematologic: denies unexplained bruises General: denies fever, chills  HENT: denies nasal congestion, sore throat, rhinorrhea, ear pain  Eyes: denies visual changes, blurred vision, eye discharge, eye redness  Neck: denies neck pain  CV: denies chest pain, palpitations  Resp: denies difficulty breathing, cough  Abdominal: +vomiting denies nausea, diarrhea, abdominal pain, blood in stool, dark stool  MSK: denies muscle aches  Neuro: denies headaches, numbness, tingling, dizziness, lightheadedness  Skin: denies rashes, cuts, bruises  Hematologic: denies unexplained bruises

## 2020-01-25 NOTE — PATIENT PROFILE ADULT - HARM RISK FACTORS
no 03/24/2016- Normal sinus rhythm at 156 beats per minute with normal axis and left ventricular hypertrophy. The VT and QTc intervals were normals at 82 and 444 milliseconds. 5/17/2017: NSR normal intervals

## 2020-01-25 NOTE — ED PROVIDER NOTE - PROGRESS NOTE DETAILS
Flavio Pringle PGY3: neuro consulted lonnie: pt seen and examined at bedside. vss. hd stable, signed out by dr peacock. pending ct /cta for further eval and admission as per neuro.

## 2020-01-25 NOTE — H&P ADULT - ASSESSMENT
50 y/o F w/ Mhx of MVP and syncope p/w and seizure most likely neurological in origin due to unknown etiology

## 2020-01-25 NOTE — ED PROVIDER NOTE - PMH
DM (diabetes mellitus)    Dyslipidemia    No pertinent past medical history <<----- Click to add NO pertinent Past Medical History

## 2020-01-25 NOTE — ED PROVIDER NOTE - PHYSICAL EXAMINATION
*GEN:   comfortable, in no acute distress, AOx3  *EYES:   pupils equally round and reactive to light, extra-occular movements intact  *HEENT:   c collar in place; airway patent, moist mucosal membranes, no chipped teeth, full ROM neck w/out midline tenderness to palpation, no munoz sign, no septal hematoma or deviation, no maxillary/mandibular mobility  *CV:   regular rate and rhythm  *RESP:   clear to auscultation bilaterally, non-labored  *ABD:   soft, non-tender  *:   no cva/flank tenderness  *MSK:   no pelvic mobility, no MSK tenderness or limited ROM across bilateral upper and lower extremities  *SKIN:  no abrasions \ bruising \ laceration noted  *NEURO:   AOx3, cranial nerves intact throughout, strength 5/5, no focal loss of sensation, no pronator drift, finger/nose normal

## 2020-01-25 NOTE — H&P ADULT - PROBLEM SELECTOR PLAN 1
Seizure due to unknown etiology, most likely neurological in origin  No clinical suspicion for infectious/metabolic etiology given afebrile/asymptomatic prior and no metabolic derangements  -CTA head/neck/brain negative for acute pathology  -neuro c/s; 24 hr EEG and MR brain Seizure due to unknown etiology, most likely neurological in origin  -No clinical suspicion for infectious/metabolic etiology given afebrile/asymptomatic prior   -No clinical suspicion for metabolic origin given absence of metabolic derangements; utox neg  -CTA head/neck/brain negative for acute pathology  -neuro c/s; 24 hr EEG and MR brain

## 2020-01-25 NOTE — H&P ADULT - HISTORY OF PRESENT ILLNESS
48 y/o F w/ Mhx of MVP and syncope 50 y/o F w/ Mhx of MVP and syncope p/w and episode of seizure x 12 hours ago. Patient states that she was getting up to go to the bathroom around 3am when she felt light headed. Patient went into bathroom to get rubbing alcohol to jose manuel the sensation of lightheadedness when she experienced an episode of LOC. Patient's daughter heard the fall and found the patient down on the floor. When she first attempted to interact with the patient, she was following commands appropriately but a few seconds later, patient started speaking incoherently w/ her tongue sticking out of her mouth. Shortly after, patient started to have whole body tremors, bowel incontinence, L head turning associated w/ repeated hitting of her head against the wall she was lying down against. The whole episode lasted approximately 1 minute. Daughter called EMS, and patient experienced post ictal confusion which gradually improved. This lasted for about an hour until patient was back to her baseline w/ no recollection of the event. Of note patient's daughter reports that her sister witnessed a similar event a few weeks ago. Patient has had syncopal episodes in the past (last in 2010) but not c/w this presentation. Patient reports headache and 1 ep of vomiting. Patient does not report f/c, n/d/c, recent sick contacts or recent travel. 50 y/o F w/ Mhx of MVP and syncope p/w and episode of seizure x 12 hours ago. Patient states that she was getting up to go to the bathroom around 3am when she felt light headed. Patient went into bathroom to get rubbing alcohol to jose manuel the sensation of lightheadedness when she experienced an episode of LOC. Patient's daughter heard the fall and found the patient down on the floor. When she first attempted to interact with the patient, she was following commands appropriately but a few seconds later, patient started speaking incoherently w/ her tongue sticking out of her mouth. Shortly after, patient started to have whole body tremors, bowel incontinence, L head turning associated w/ repeated hitting of her head against the wall she was lying down against. The whole episode lasted approximately 1 minute. Daughter called EMS, and patient experienced post ictal confusion which gradually improved. This lasted for about an hour until patient was back to her baseline w/ no recollection of the event. Of note patient's daughter reports that her sister witnessed a similar event a few weeks ago. Patient has had syncopal episodes in the past (last in 2010) but not c/w this presentation. Patient reports headache and 1 ep of vomiting. Patient does not report f/c, n/d/c, recent sick contacts or recent travel.     In the ED:  Vitals  T-97.7 F  HR-72  BP-97/64  RR-16  SpO2%- 100 on RA    Labs s/f Hb 8.6 (BL 13), CK-172, CTA head/neck/brain negative for acute path, CXR clear  Patient was placed on maintenance LR in the ED

## 2020-01-25 NOTE — H&P ADULT - PROBLEM SELECTOR PLAN 4
Dispo- seizure w/u as per neuro; likely d/c home w/ outpt neuro follow up Improve score 0  -Encourage ambulation

## 2020-01-25 NOTE — ED PROVIDER NOTE - ATTENDING CONTRIBUTION TO CARE
MD Aiken:  I performed a face to face bedside interview with patient regarding history of present illness, review of symptoms and past medical history. I completed an independent physical exam(documented below).  I have discussed patient's plan of care with resident.   I agree with note as stated above, having amended the EMR as needed to reflect my findings. I have discussed the assessment and plan of care.  This includes during the time I functioned as the attending physician for this patient.  PE:  Gen: Alert, NAD  Head: NC, AT,  EOMI, normal lids/conjunctiva  ENT:  normal hearing, patent oropharynx without erythema/exudate  Neck: +supple, no tenderness/meningismus/JVD, +Trachea midline  Chest: no chest wall tenderness, equal chest rise  Pulm: Bilateral BS, normal resp effort, no wheeze/stridor/retractions  CV: RRR, no M/R/G, +dist pulses  Abd: +BS, soft, NT/ND  Rectal: deferred  Mskel: no edema/erythema/cyanosis  Skin: no rash  Neuro: AAOx3, no sensory/motor deficits, CN 2-12 intact   MDM:  48yo F, denies pmh, bibems for medical eval after unwitnessed fall, shaking movements noted by daughter and ?pt incontinent of urine when found. Denies antecedent cp/sob/palpitation. Labs, imaging, meds, admit for syncope w/u.

## 2020-01-25 NOTE — H&P ADULT - ATTENDING COMMENTS
49F presented with LOC and witnessed convulsion by daughter at home. reported postictal phase ~1hr, now mental status back to baseline. patient does not recall earlier event. Seizure vs convulsive syncope. given prolonged AMS, likely seizure. neuro input appreciated, pending VEEG. TTE to evaluate for valvular dz.

## 2020-01-25 NOTE — H&P ADULT - NSHPLABSRESULTS_GEN_ALL_CORE
.  LABS:                         8.6    7.62  )-----------( 311      ( 2020 06:00 )             29.1         135  |  101  |  7   ----------------------------<  163<H>  4.4   |  22  |  0.53    Ca    8.8      2020 06:00    TPro  6.9  /  Alb  4.0  /  TBili  0.7  /  DBili  x   /  AST  21  /  ALT  17  /  AlkPhos  94        Urinalysis Basic - ( 2020 09:15 )    Color: COLORLESS / Appearance: CLEAR / S.006 / pH: 6.5  Gluc: NEGATIVE / Ketone: NEGATIVE  / Bili: NEGATIVE / Urobili: NORMAL   Blood: MODERATE / Protein: NEGATIVE / Nitrite: NEGATIVE   Leuk Esterase: NEGATIVE / RBC: 3-5 / WBC 0-2   Sq Epi: OCC / Non Sq Epi: x / Bacteria: NEGATIVE            RADIOLOGY, EKG & ADDITIONAL TESTS: Reviewed.

## 2020-01-25 NOTE — H&P ADULT - NSHPPHYSICALEXAM_GEN_ALL_CORE
General appearance: NAD, conversant, afebrile    Eyes: anicteric sclerae, moist conjunctivae; no lid-lag; Pupils equal, round and reactive to light; Extraocular muscles intact   HENT: Atraumatic; oropharynx clear with moist mucous membranes    Neck: Trachea midline; Full range of motion, supple   Pulm: Lungs clear to auscultation bilaterally, with normal respiratory effort and no intercostal retractions; normal work of breathing   CV: Regular Rhythm and Rate; Normal S1, S2; No murmurs, rubs, or gallops   Abdomen: Soft, non-tender, non-distended  Neuro: CN II-XII   Psych: Appropriate affect, cooperative; alert and oriented to person, place and time General appearance: NAD, conversant, afebrile    Eyes: anicteric sclerae, moist conjunctivae; no lid-lag; Pupils equal, round and reactive to light; Extraocular muscles intact   HENT: Atraumatic; oropharynx clear with moist mucous membranes    Neck: Trachea midline; Full range of motion, supple   Pulm: Lungs clear to auscultation bilaterally, with normal respiratory effort and no intercostal retractions; normal work of breathing   CV: Regular Rhythm and Rate; Normal S1, S2; No murmurs, rubs, or gallops   Abdomen: Soft, non-tender, non-distended  Neuro: CN II-XII grossly intact, 5/5 strength and sensation intact in all extremities, FTN intact, Romberg negative   Psych: Appropriate affect, cooperative; alert and oriented to person, place and time

## 2020-01-25 NOTE — ED ADULT TRIAGE NOTE - CHIEF COMPLAINT QUOTE
seizure/ams    daughter came home approx 10pm... states she didn't see her mother during the day... mother was in bed already... at 3am, daughter heard a loud noise... found on floor in bathroom with head leaning to left side... then started to shake for approx 1-2 mins...struck right side of head lightlky against the wall.  pt became incontinent of bowel and bladder, vomited x1.  pt aa&ox4... states she stayed home today.... did some cleaning.... when she woke up to go to the bathroom, felt dizzy.  no previous seizure history.  hx of low bp... no meds.  unknown last time seen normal.  received with c-collar in situ.

## 2020-01-26 LAB
ALBUMIN SERPL ELPH-MCNC: 4.1 G/DL — SIGNIFICANT CHANGE UP (ref 3.3–5)
ALP SERPL-CCNC: 89 U/L — SIGNIFICANT CHANGE UP (ref 40–120)
ALT FLD-CCNC: 18 U/L — SIGNIFICANT CHANGE UP (ref 4–33)
ANION GAP SERPL CALC-SCNC: 12 MMO/L — SIGNIFICANT CHANGE UP (ref 7–14)
AST SERPL-CCNC: 13 U/L — SIGNIFICANT CHANGE UP (ref 4–32)
BASOPHILS # BLD AUTO: 0.02 K/UL — SIGNIFICANT CHANGE UP (ref 0–0.2)
BASOPHILS NFR BLD AUTO: 0.4 % — SIGNIFICANT CHANGE UP (ref 0–2)
BILIRUB SERPL-MCNC: 0.4 MG/DL — SIGNIFICANT CHANGE UP (ref 0.2–1.2)
BUN SERPL-MCNC: 5 MG/DL — LOW (ref 7–23)
CALCIUM SERPL-MCNC: 9.2 MG/DL — SIGNIFICANT CHANGE UP (ref 8.4–10.5)
CHLORIDE SERPL-SCNC: 102 MMOL/L — SIGNIFICANT CHANGE UP (ref 98–107)
CO2 SERPL-SCNC: 25 MMOL/L — SIGNIFICANT CHANGE UP (ref 22–31)
CREAT SERPL-MCNC: 0.5 MG/DL — SIGNIFICANT CHANGE UP (ref 0.5–1.3)
EOSINOPHIL # BLD AUTO: 0.2 K/UL — SIGNIFICANT CHANGE UP (ref 0–0.5)
EOSINOPHIL NFR BLD AUTO: 3.5 % — SIGNIFICANT CHANGE UP (ref 0–6)
GLUCOSE SERPL-MCNC: 142 MG/DL — HIGH (ref 70–99)
HBA1C BLD-MCNC: 6.7 % — HIGH (ref 4–5.6)
HCG SERPL-ACNC: < 5 MIU/ML — SIGNIFICANT CHANGE UP
HCT VFR BLD CALC: 28.2 % — LOW (ref 34.5–45)
HGB BLD-MCNC: 8.3 G/DL — LOW (ref 11.5–15.5)
IMM GRANULOCYTES NFR BLD AUTO: 0.7 % — SIGNIFICANT CHANGE UP (ref 0–1.5)
LYMPHOCYTES # BLD AUTO: 1.96 K/UL — SIGNIFICANT CHANGE UP (ref 1–3.3)
LYMPHOCYTES # BLD AUTO: 34.6 % — SIGNIFICANT CHANGE UP (ref 13–44)
MAGNESIUM SERPL-MCNC: 1.8 MG/DL — SIGNIFICANT CHANGE UP (ref 1.6–2.6)
MCHC RBC-ENTMCNC: 21.2 PG — LOW (ref 27–34)
MCHC RBC-ENTMCNC: 29.4 % — LOW (ref 32–36)
MCV RBC AUTO: 72.1 FL — LOW (ref 80–100)
MONOCYTES # BLD AUTO: 0.34 K/UL — SIGNIFICANT CHANGE UP (ref 0–0.9)
MONOCYTES NFR BLD AUTO: 6 % — SIGNIFICANT CHANGE UP (ref 2–14)
NEUTROPHILS # BLD AUTO: 3.11 K/UL — SIGNIFICANT CHANGE UP (ref 1.8–7.4)
NEUTROPHILS NFR BLD AUTO: 54.8 % — SIGNIFICANT CHANGE UP (ref 43–77)
NRBC # FLD: 0 K/UL — SIGNIFICANT CHANGE UP (ref 0–0)
PHOSPHATE SERPL-MCNC: 2.6 MG/DL — SIGNIFICANT CHANGE UP (ref 2.5–4.5)
PLATELET # BLD AUTO: 306 K/UL — SIGNIFICANT CHANGE UP (ref 150–400)
PMV BLD: 9.8 FL — SIGNIFICANT CHANGE UP (ref 7–13)
POTASSIUM SERPL-MCNC: 4.2 MMOL/L — SIGNIFICANT CHANGE UP (ref 3.5–5.3)
POTASSIUM SERPL-SCNC: 4.2 MMOL/L — SIGNIFICANT CHANGE UP (ref 3.5–5.3)
PROLACTIN SERPL-MCNC: 5.6 NG/ML — SIGNIFICANT CHANGE UP (ref 3.4–24.1)
PROT SERPL-MCNC: 6.3 G/DL — SIGNIFICANT CHANGE UP (ref 6–8.3)
RBC # BLD: 3.91 M/UL — SIGNIFICANT CHANGE UP (ref 3.8–5.2)
RBC # FLD: 16.8 % — HIGH (ref 10.3–14.5)
SODIUM SERPL-SCNC: 139 MMOL/L — SIGNIFICANT CHANGE UP (ref 135–145)
TROPONIN T, HIGH SENSITIVITY: < 6 NG/L — SIGNIFICANT CHANGE UP (ref ?–14)
WBC # BLD: 5.67 K/UL — SIGNIFICANT CHANGE UP (ref 3.8–10.5)
WBC # FLD AUTO: 5.67 K/UL — SIGNIFICANT CHANGE UP (ref 3.8–10.5)

## 2020-01-26 PROCEDURE — 93010 ELECTROCARDIOGRAM REPORT: CPT

## 2020-01-26 PROCEDURE — 99233 SBSQ HOSP IP/OBS HIGH 50: CPT | Mod: GC

## 2020-01-26 RX ADMIN — Medication 1 TABLET(S): at 11:58

## 2020-01-26 RX ADMIN — Medication 2 GRAM(S): at 05:28

## 2020-01-26 RX ADMIN — Medication 81 MILLIGRAM(S): at 11:58

## 2020-01-26 RX ADMIN — SODIUM CHLORIDE 100 MILLILITER(S): 9 INJECTION, SOLUTION INTRAVENOUS at 02:13

## 2020-01-26 RX ADMIN — Medication 2 GRAM(S): at 17:45

## 2020-01-26 NOTE — PROGRESS NOTE ADULT - ASSESSMENT
48 y/o F w/ Mhx of MVP and syncope p/w and seizure most likely neurological in origin due to unknown etiology

## 2020-01-26 NOTE — PROGRESS NOTE ADULT - ATTENDING COMMENTS
49 y.o. Female w/ Hx MVP, syncope in the past a/w seizure. check 24 hour EEG. F/U Neurology recs in regard to AEDs. F/U TTE.

## 2020-01-26 NOTE — PROGRESS NOTE ADULT - PROBLEM SELECTOR PLAN 3
not on meds/insulin  -FS w/in control; will obtain A1C not on meds/insulin; A1C 6.7  -FS w/in control  - poss endo consult

## 2020-01-26 NOTE — PROGRESS NOTE ADULT - SUBJECTIVE AND OBJECTIVE BOX
PROGRESS NOTE:     Patient is a 49y old  Female who presents with a chief complaint of Seizure (2020 16:09)      SUBJECTIVE / OVERNIGHT EVENTS:        MEDICATIONS  (STANDING):  aspirin enteric coated 81 milliGRAM(s) Oral daily  influenza   Vaccine 0.5 milliLiter(s) IntraMuscular once  multivitamin 1 Tablet(s) Oral daily  omega-3-Acid Ethyl Esters 2 Gram(s) Oral two times a day    MEDICATIONS  (PRN):      CAPILLARY BLOOD GLUCOSE        I&O's Summary      PHYSICAL EXAM:  Vital Signs Last 24 Hrs  T(C): 36.2 (2020 05:26), Max: 36.8 (2020 13:26)  T(F): 97.2 (2020 05:26), Max: 98.3 (2020 21:55)  HR: 70 (2020 05:26) (70 - 87)  BP: 119/72 (2020 05:26) (105/65 - 123/66)  BP(mean): --  RR: 17 (2020 05:26) (14 - 17)  SpO2: 100% (2020 05:26) (98% - 100%)    CONSTITUTIONAL: NAD, well-developed  RESPIRATORY: Normal respiratory effort; lungs are clear to auscultation bilaterally  CARDIOVASCULAR: Regular rate and rhythm, normal S1 and S2, no murmur/rub/gallop; No lower extremity edema; Peripheral pulses are 2+ bilaterally  ABDOMEN: Nontender to palpation, normoactive bowel sounds, no rebound/guarding; No hepatosplenomegaly  MUSCLOSKELETAL: no clubbing or cyanosis of digits; no joint swelling or tenderness to palpation  NEURO: CN 2-12 grossly intact, moves all limbs spontaneously  PSYCH: A+O to person, place, and time; affect appropriate    LABS:                        8.6    7.62  )-----------( 311      ( 2020 06:00 )             29.1         135  |  101  |  7   ----------------------------<  163<H>  4.4   |  22  |  0.53    Ca    8.8      2020 06:00    TPro  6.9  /  Alb  4.0  /  TBili  0.7  /  DBili  x   /  AST  21  /  ALT  17  /  AlkPhos  94        CARDIAC MARKERS ( 2020 07:43 )  x     / x     / 172 u/L / x     / x      CARDIAC MARKERS ( 2020 06:00 )  x     / x     / 172 u/L / 1.58 ng/mL / x          Urinalysis Basic - ( 2020 09:15 )    Color: COLORLESS / Appearance: CLEAR / S.006 / pH: 6.5  Gluc: NEGATIVE / Ketone: NEGATIVE  / Bili: NEGATIVE / Urobili: NORMAL   Blood: MODERATE / Protein: NEGATIVE / Nitrite: NEGATIVE   Leuk Esterase: NEGATIVE / RBC: 3-5 / WBC 0-2   Sq Epi: OCC / Non Sq Epi: x / Bacteria: NEGATIVE          RADIOLOGY & ADDITIONAL TESTS:  Results Reviewed:   Imaging Personally Reviewed:  Electrocardiogram Personally Reviewed:    COORDINATION OF CARE:  Care Discussed with Consultants/Other Providers [Y/N]:  Prior or Outpatient Records Reviewed [Y/N]: PROGRESS NOTE:     Patient is a 49y old  Female who presents with a chief complaint of Seizure (2020 16:09)      SUBJECTIVE / OVERNIGHT EVENTS: Patient seen and examined at bedside, c/o headache o/n relieved by tylenol and cp in the AM w/ neg trop and normal ekg.        MEDICATIONS  (STANDING):  aspirin enteric coated 81 milliGRAM(s) Oral daily  influenza   Vaccine 0.5 milliLiter(s) IntraMuscular once  multivitamin 1 Tablet(s) Oral daily  omega-3-Acid Ethyl Esters 2 Gram(s) Oral two times a day    MEDICATIONS  (PRN):      CAPILLARY BLOOD GLUCOSE        I&O's Summary      PHYSICAL EXAM:  Vital Signs Last 24 Hrs  T(C): 36.2 (2020 05:26), Max: 36.8 (2020 13:26)  T(F): 97.2 (2020 05:26), Max: 98.3 (2020 21:55)  HR: 70 (2020 05:26) (70 - 87)  BP: 119/72 (2020 05:26) (105/65 - 123/66)  BP(mean): --  RR: 17 (2020 05:26) (14 - 17)  SpO2: 100% (2020 05:26) (98% - 100%)     General appearance: NAD, conversant, afebrile    Eyes: anicteric sclerae, moist conjunctivae; no lid-lag. PERRLA    HENT: Atraumatic; oropharynx clear with moist mucous membranes    Neck: Trachea midline; Full range of motion, supple   Pulm: Lungs clear to auscultation bilaterally, with normal respiratory effort and no intercostal retractions; normal work of breathing   CV: Regular Rhythm and Rate; Normal S1, S2; No murmurs, rubs, or gallops   Abdomen: Soft, non-tender, non-distended   Extremities: No peripheral edema, 5/5 strength in all four extremities.   Psych: Appropriate affect, cooperative; alert and oriented to person, place and time      LABS:                        8.6    7.62  )-----------( 311      ( 2020 06:00 )             29.1     -    135  |  101  |  7   ----------------------------<  163<H>  4.4   |  22  |  0.53    Ca    8.8      2020 06:00    TPro  6.9  /  Alb  4.0  /  TBili  0.7  /  DBili  x   /  AST  21  /  ALT  17  /  AlkPhos  94  -      CARDIAC MARKERS ( 2020 07:43 )  x     / x     / 172 u/L / x     / x      CARDIAC MARKERS ( 2020 06:00 )  x     / x     / 172 u/L / 1.58 ng/mL / x          Urinalysis Basic - ( 2020 09:15 )    Color: COLORLESS / Appearance: CLEAR / S.006 / pH: 6.5  Gluc: NEGATIVE / Ketone: NEGATIVE  / Bili: NEGATIVE / Urobili: NORMAL   Blood: MODERATE / Protein: NEGATIVE / Nitrite: NEGATIVE   Leuk Esterase: NEGATIVE / RBC: 3-5 / WBC 0-2   Sq Epi: OCC / Non Sq Epi: x / Bacteria: NEGATIVE          RADIOLOGY & ADDITIONAL TESTS:  Results Reviewed:   Imaging Personally Reviewed:  Electrocardiogram Personally Reviewed:    COORDINATION OF CARE:  Care Discussed with Consultants/Other Providers [Y/N]:  Prior or Outpatient Records Reviewed [Y/N]:

## 2020-01-26 NOTE — PROGRESS NOTE ADULT - PROBLEM SELECTOR PLAN 1
Seizure due to unknown etiology, most likely neurological in origin  -No clinical suspicion for infectious/metabolic etiology given afebrile/asymptomatic prior   -No clinical suspicion for metabolic origin given absence of metabolic derangements; utox neg  -CTA head/neck/brain negative for acute pathology  -neuro c/s; 24 hr EEG and MR brain Seizure due to unknown etiology, most likely neurological in origin  -No clinical suspicion for infectious/metabolic etiology given afebrile/asymptomatic prior   -No clinical suspicion for metabolic origin given absence of metabolic derangements; utox neg  -CTA head/neck/brain negative for acute pathology  -neuro c/s; 24 hr EEG and MR brain  -pend echo

## 2020-01-26 NOTE — PROGRESS NOTE ADULT - PROBLEM SELECTOR PLAN 2
Current hb 8.7; 13 on last admission  -no active sources of bleeding; will cont to monitor   -Transfuse PRN if Hb<7

## 2020-01-27 LAB
ANION GAP SERPL CALC-SCNC: 14 MMO/L — SIGNIFICANT CHANGE UP (ref 7–14)
BASOPHILS # BLD AUTO: 0.03 K/UL — SIGNIFICANT CHANGE UP (ref 0–0.2)
BASOPHILS NFR BLD AUTO: 0.6 % — SIGNIFICANT CHANGE UP (ref 0–2)
BUN SERPL-MCNC: 9 MG/DL — SIGNIFICANT CHANGE UP (ref 7–23)
CALCIUM SERPL-MCNC: 8.7 MG/DL — SIGNIFICANT CHANGE UP (ref 8.4–10.5)
CHLORIDE SERPL-SCNC: 100 MMOL/L — SIGNIFICANT CHANGE UP (ref 98–107)
CO2 SERPL-SCNC: 23 MMOL/L — SIGNIFICANT CHANGE UP (ref 22–31)
CREAT SERPL-MCNC: 0.53 MG/DL — SIGNIFICANT CHANGE UP (ref 0.5–1.3)
EOSINOPHIL # BLD AUTO: 0.24 K/UL — SIGNIFICANT CHANGE UP (ref 0–0.5)
EOSINOPHIL NFR BLD AUTO: 4.4 % — SIGNIFICANT CHANGE UP (ref 0–6)
GLUCOSE SERPL-MCNC: 149 MG/DL — HIGH (ref 70–99)
HCT VFR BLD CALC: 28.1 % — LOW (ref 34.5–45)
HGB BLD-MCNC: 8.2 G/DL — LOW (ref 11.5–15.5)
IMM GRANULOCYTES NFR BLD AUTO: 0.4 % — SIGNIFICANT CHANGE UP (ref 0–1.5)
LYMPHOCYTES # BLD AUTO: 1.8 K/UL — SIGNIFICANT CHANGE UP (ref 1–3.3)
LYMPHOCYTES # BLD AUTO: 33.3 % — SIGNIFICANT CHANGE UP (ref 13–44)
MAGNESIUM SERPL-MCNC: 1.8 MG/DL — SIGNIFICANT CHANGE UP (ref 1.6–2.6)
MCHC RBC-ENTMCNC: 21.5 PG — LOW (ref 27–34)
MCHC RBC-ENTMCNC: 29.2 % — LOW (ref 32–36)
MCV RBC AUTO: 73.8 FL — LOW (ref 80–100)
MONOCYTES # BLD AUTO: 0.31 K/UL — SIGNIFICANT CHANGE UP (ref 0–0.9)
MONOCYTES NFR BLD AUTO: 5.7 % — SIGNIFICANT CHANGE UP (ref 2–14)
NEUTROPHILS # BLD AUTO: 3 K/UL — SIGNIFICANT CHANGE UP (ref 1.8–7.4)
NEUTROPHILS NFR BLD AUTO: 55.6 % — SIGNIFICANT CHANGE UP (ref 43–77)
NRBC # FLD: 0 K/UL — SIGNIFICANT CHANGE UP (ref 0–0)
PHOSPHATE SERPL-MCNC: 3.4 MG/DL — SIGNIFICANT CHANGE UP (ref 2.5–4.5)
PLATELET # BLD AUTO: 342 K/UL — SIGNIFICANT CHANGE UP (ref 150–400)
PMV BLD: 10.2 FL — SIGNIFICANT CHANGE UP (ref 7–13)
POTASSIUM SERPL-MCNC: 4 MMOL/L — SIGNIFICANT CHANGE UP (ref 3.5–5.3)
POTASSIUM SERPL-SCNC: 4 MMOL/L — SIGNIFICANT CHANGE UP (ref 3.5–5.3)
RBC # BLD: 3.81 M/UL — SIGNIFICANT CHANGE UP (ref 3.8–5.2)
RBC # FLD: 16.8 % — HIGH (ref 10.3–14.5)
SODIUM SERPL-SCNC: 137 MMOL/L — SIGNIFICANT CHANGE UP (ref 135–145)
WBC # BLD: 5.4 K/UL — SIGNIFICANT CHANGE UP (ref 3.8–10.5)
WBC # FLD AUTO: 5.4 K/UL — SIGNIFICANT CHANGE UP (ref 3.8–10.5)

## 2020-01-27 PROCEDURE — 99233 SBSQ HOSP IP/OBS HIGH 50: CPT | Mod: GC

## 2020-01-27 RX ORDER — ACETAMINOPHEN 500 MG
650 TABLET ORAL EVERY 6 HOURS
Refills: 0 | Status: DISCONTINUED | OUTPATIENT
Start: 2020-01-27 | End: 2020-01-30

## 2020-01-27 RX ADMIN — Medication 1 TABLET(S): at 12:41

## 2020-01-27 RX ADMIN — Medication 81 MILLIGRAM(S): at 12:41

## 2020-01-27 RX ADMIN — Medication 2 GRAM(S): at 17:24

## 2020-01-27 RX ADMIN — Medication 2 GRAM(S): at 05:01

## 2020-01-27 NOTE — PROGRESS NOTE ADULT - PROBLEM SELECTOR PLAN 1
Seizure due to unknown etiology, most likely neurological in origin  -No clinical suspicion for infectious/metabolic etiology given afebrile/asymptomatic prior   -No clinical suspicion for metabolic origin given absence of metabolic derangements; utox neg  -CTA head/neck/brain negative for acute pathology  -neuro c/s; 24 hr EEG and MR brain  -pend echo

## 2020-01-27 NOTE — PROGRESS NOTE ADULT - PROBLEM SELECTOR PLAN 5
Dispo- seizure w/u as per neuro; likely d/c home w/ outpt neuro follow up Dispo- seizure w/u as per neuro; likely d/c home w/ outpt neuro follow up    1.  Name of PCP:  2.  PCP Contacted on Admission: [ ] Y    [ ] N    3.  PCP contacted at Discharge: [ ] Y    [ ] N    [ ] N/A  4.  Post-Discharge Appointment Date and Location:  5.  Summary of Handoff given to PCP:

## 2020-01-27 NOTE — PROGRESS NOTE ADULT - PROBLEM SELECTOR PLAN 3
not on meds/insulin; A1C 6.7  -FS w/in control  - poss endo consult not on meds/insulin; A1C 6.7  -FS w/in control  -poss endo consult v outpt f/u

## 2020-01-27 NOTE — PROGRESS NOTE ADULT - SUBJECTIVE AND OBJECTIVE BOX
PROGRESS NOTE:     Patient is a 49y old  Female who presents with a chief complaint of Seizure (26 Jan 2020 08:24)      SUBJECTIVE / OVERNIGHT EVENTS:        MEDICATIONS  (STANDING):  aspirin enteric coated 81 milliGRAM(s) Oral daily  influenza   Vaccine 0.5 milliLiter(s) IntraMuscular once  multivitamin 1 Tablet(s) Oral daily  omega-3-Acid Ethyl Esters 2 Gram(s) Oral two times a day    MEDICATIONS  (PRN):      CAPILLARY BLOOD GLUCOSE        I&O's Summary      PHYSICAL EXAM:  Vital Signs Last 24 Hrs  T(C): 37 (27 Jan 2020 04:55), Max: 37 (27 Jan 2020 04:55)  T(F): 98.6 (27 Jan 2020 04:55), Max: 98.6 (27 Jan 2020 04:55)  HR: 85 (27 Jan 2020 04:55) (83 - 89)  BP: 127/64 (27 Jan 2020 04:55) (124/62 - 136/73)  BP(mean): --  RR: 18 (27 Jan 2020 04:55) (18 - 18)  SpO2: 100% (27 Jan 2020 04:55) (98% - 100%)    CONSTITUTIONAL: NAD, well-developed  RESPIRATORY: Normal respiratory effort; lungs are clear to auscultation bilaterally  CARDIOVASCULAR: Regular rate and rhythm, normal S1 and S2, no murmur/rub/gallop; No lower extremity edema; Peripheral pulses are 2+ bilaterally  ABDOMEN: Nontender to palpation, normoactive bowel sounds, no rebound/guarding; No hepatosplenomegaly  MUSCLOSKELETAL: no clubbing or cyanosis of digits; no joint swelling or tenderness to palpation  NEURO: CN 2-12 grossly intact, moves all limbs spontaneously  PSYCH: A+O to person, place, and time; affect appropriate    LABS:                        8.2    5.40  )-----------( 342      ( 27 Jan 2020 06:55 )             28.1     01-27    137  |  100  |  9   ----------------------------<  149<H>  4.0   |  23  |  0.53    Ca    8.7      27 Jan 2020 06:55  Phos  3.4     01-27  Mg     1.8     01-27    TPro  6.3  /  Alb  4.1  /  TBili  0.4  /  DBili  x   /  AST  13  /  ALT  18  /  AlkPhos  89  01-26                RADIOLOGY & ADDITIONAL TESTS:  Results Reviewed:   Imaging Personally Reviewed:  Electrocardiogram Personally Reviewed:    COORDINATION OF CARE:  Care Discussed with Consultants/Other Providers [Y/N]:  Prior or Outpatient Records Reviewed [Y/N]: PROGRESS NOTE:     Patient is a 49y old  Female who presents with a chief complaint of Seizure (26 Jan 2020 08:24)      SUBJECTIVE / OVERNIGHT EVENTS: Patient seen and examined at bedside w/ no subjective complaints. NAEO.         MEDICATIONS  (STANDING):  aspirin enteric coated 81 milliGRAM(s) Oral daily  influenza   Vaccine 0.5 milliLiter(s) IntraMuscular once  multivitamin 1 Tablet(s) Oral daily  omega-3-Acid Ethyl Esters 2 Gram(s) Oral two times a day    MEDICATIONS  (PRN):      CAPILLARY BLOOD GLUCOSE        I&O's Summary      PHYSICAL EXAM:  Vital Signs Last 24 Hrs  T(C): 37 (27 Jan 2020 04:55), Max: 37 (27 Jan 2020 04:55)  T(F): 98.6 (27 Jan 2020 04:55), Max: 98.6 (27 Jan 2020 04:55)  HR: 85 (27 Jan 2020 04:55) (83 - 89)  BP: 127/64 (27 Jan 2020 04:55) (124/62 - 136/73)  BP(mean): --  RR: 18 (27 Jan 2020 04:55) (18 - 18)  SpO2: 100% (27 Jan 2020 04:55) (98% - 100%)    General appearance: NAD, conversant, afebrile    Eyes: anicteric sclerae, moist conjunctivae; no lid-lag   HENT: Atraumatic; oropharynx clear with moist mucous membranes   Neck: Trachea midline; Full range of motion, supple   Pulm: Lungs clear to auscultation bilaterally, with normal respiratory effort and no intercostal retractions; normal work of breathing   CV: Regular Rhythm and Rate; Normal S1, S2; No murmurs, rubs, or gallops   Abdomen: Soft, non-tender, non-distended   Extremities: No peripheral edema, 5/5 strength in all four extremities.   Psych: Appropriate affect, cooperative; alert and oriented to person, place and time      LABS:                        8.2    5.40  )-----------( 342      ( 27 Jan 2020 06:55 )             28.1     01-27    137  |  100  |  9   ----------------------------<  149<H>  4.0   |  23  |  0.53    Ca    8.7      27 Jan 2020 06:55  Phos  3.4     01-27  Mg     1.8     01-27    TPro  6.3  /  Alb  4.1  /  TBili  0.4  /  DBili  x   /  AST  13  /  ALT  18  /  AlkPhos  89  01-26                RADIOLOGY & ADDITIONAL TESTS:  Results Reviewed:   Imaging Personally Reviewed:  Electrocardiogram Personally Reviewed:    COORDINATION OF CARE:  Care Discussed with Consultants/Other Providers [Y/N]:  Prior or Outpatient Records Reviewed [Y/N]:

## 2020-01-27 NOTE — PROGRESS NOTE ADULT - ATTENDING COMMENTS
Patient seen and examined by me. Case discussed with resident and agree with the resident's findings and plan as documented in the resident's note. 49F h/o MVP and syncope p/w seizure vs. syncope followed by seizure.   -f/u neuro recs  -f/u video EEG  -f/u MRI brain -- awaiting OBGYN records as patient reports having a permanently implanted birth control device (called Essure) to make sure it is MRI compatible.

## 2020-01-28 LAB
ANION GAP SERPL CALC-SCNC: 12 MMO/L — SIGNIFICANT CHANGE UP (ref 7–14)
BASOPHILS # BLD AUTO: 0.05 K/UL — SIGNIFICANT CHANGE UP (ref 0–0.2)
BASOPHILS NFR BLD AUTO: 0.9 % — SIGNIFICANT CHANGE UP (ref 0–2)
BUN SERPL-MCNC: 10 MG/DL — SIGNIFICANT CHANGE UP (ref 7–23)
CALCIUM SERPL-MCNC: 9 MG/DL — SIGNIFICANT CHANGE UP (ref 8.4–10.5)
CHLORIDE SERPL-SCNC: 101 MMOL/L — SIGNIFICANT CHANGE UP (ref 98–107)
CO2 SERPL-SCNC: 23 MMOL/L — SIGNIFICANT CHANGE UP (ref 22–31)
CREAT SERPL-MCNC: 0.47 MG/DL — LOW (ref 0.5–1.3)
EOSINOPHIL # BLD AUTO: 0.21 K/UL — SIGNIFICANT CHANGE UP (ref 0–0.5)
EOSINOPHIL NFR BLD AUTO: 3.8 % — SIGNIFICANT CHANGE UP (ref 0–6)
GLUCOSE SERPL-MCNC: 141 MG/DL — HIGH (ref 70–99)
HCT VFR BLD CALC: 28.5 % — LOW (ref 34.5–45)
HGB BLD-MCNC: 8.3 G/DL — LOW (ref 11.5–15.5)
IMM GRANULOCYTES NFR BLD AUTO: 0.4 % — SIGNIFICANT CHANGE UP (ref 0–1.5)
LYMPHOCYTES # BLD AUTO: 1.96 K/UL — SIGNIFICANT CHANGE UP (ref 1–3.3)
LYMPHOCYTES # BLD AUTO: 35.3 % — SIGNIFICANT CHANGE UP (ref 13–44)
MAGNESIUM SERPL-MCNC: 1.8 MG/DL — SIGNIFICANT CHANGE UP (ref 1.6–2.6)
MCHC RBC-ENTMCNC: 21.2 PG — LOW (ref 27–34)
MCHC RBC-ENTMCNC: 29.1 % — LOW (ref 32–36)
MCV RBC AUTO: 72.9 FL — LOW (ref 80–100)
MONOCYTES # BLD AUTO: 0.3 K/UL — SIGNIFICANT CHANGE UP (ref 0–0.9)
MONOCYTES NFR BLD AUTO: 5.4 % — SIGNIFICANT CHANGE UP (ref 2–14)
NEUTROPHILS # BLD AUTO: 3.02 K/UL — SIGNIFICANT CHANGE UP (ref 1.8–7.4)
NEUTROPHILS NFR BLD AUTO: 54.2 % — SIGNIFICANT CHANGE UP (ref 43–77)
NRBC # FLD: 0 K/UL — SIGNIFICANT CHANGE UP (ref 0–0)
PHOSPHATE SERPL-MCNC: 3.7 MG/DL — SIGNIFICANT CHANGE UP (ref 2.5–4.5)
PLATELET # BLD AUTO: 341 K/UL — SIGNIFICANT CHANGE UP (ref 150–400)
PMV BLD: 9.9 FL — SIGNIFICANT CHANGE UP (ref 7–13)
POTASSIUM SERPL-MCNC: 3.9 MMOL/L — SIGNIFICANT CHANGE UP (ref 3.5–5.3)
POTASSIUM SERPL-SCNC: 3.9 MMOL/L — SIGNIFICANT CHANGE UP (ref 3.5–5.3)
RBC # BLD: 3.91 M/UL — SIGNIFICANT CHANGE UP (ref 3.8–5.2)
RBC # FLD: 17 % — HIGH (ref 10.3–14.5)
SODIUM SERPL-SCNC: 136 MMOL/L — SIGNIFICANT CHANGE UP (ref 135–145)
WBC # BLD: 5.56 K/UL — SIGNIFICANT CHANGE UP (ref 3.8–10.5)
WBC # FLD AUTO: 5.56 K/UL — SIGNIFICANT CHANGE UP (ref 3.8–10.5)

## 2020-01-28 PROCEDURE — 95819 EEG AWAKE AND ASLEEP: CPT | Mod: 26

## 2020-01-28 PROCEDURE — 93306 TTE W/DOPPLER COMPLETE: CPT | Mod: 26

## 2020-01-28 PROCEDURE — 99233 SBSQ HOSP IP/OBS HIGH 50: CPT | Mod: GC

## 2020-01-28 RX ADMIN — Medication 650 MILLIGRAM(S): at 12:41

## 2020-01-28 RX ADMIN — Medication 650 MILLIGRAM(S): at 13:30

## 2020-01-28 RX ADMIN — Medication 2 GRAM(S): at 07:24

## 2020-01-28 RX ADMIN — Medication 81 MILLIGRAM(S): at 12:29

## 2020-01-28 RX ADMIN — Medication 2 GRAM(S): at 17:56

## 2020-01-28 RX ADMIN — Medication 1 TABLET(S): at 12:29

## 2020-01-28 NOTE — PROGRESS NOTE ADULT - PROBLEM SELECTOR PLAN 2
Current hb 8.3; 13 on last admission  -H&H stable  -no active sources of bleeding; will cont to monitor   -Transfuse PRN if Hb<7

## 2020-01-28 NOTE — PROGRESS NOTE ADULT - SUBJECTIVE AND OBJECTIVE BOX
PROGRESS NOTE:     Patient is a 49y old  Female who presents with a chief complaint of Seizure (27 Jan 2020 09:56)      SUBJECTIVE / OVERNIGHT EVENTS:        MEDICATIONS  (STANDING):  aspirin enteric coated 81 milliGRAM(s) Oral daily  influenza   Vaccine 0.5 milliLiter(s) IntraMuscular once  multivitamin 1 Tablet(s) Oral daily  omega-3-Acid Ethyl Esters 2 Gram(s) Oral two times a day    MEDICATIONS  (PRN):  acetaminophen   Tablet .. 650 milliGRAM(s) Oral every 6 hours PRN Temp greater or equal to 38C (100.4F), Moderate Pain (4 - 6)      CAPILLARY BLOOD GLUCOSE        I&O's Summary      PHYSICAL EXAM:  Vital Signs Last 24 Hrs  T(C): 36.7 (27 Jan 2020 22:00), Max: 36.7 (27 Jan 2020 22:00)  T(F): 98 (27 Jan 2020 22:00), Max: 98 (27 Jan 2020 22:00)  HR: 73 (27 Jan 2020 22:00) (73 - 89)  BP: 117/60 (27 Jan 2020 22:00) (117/60 - 129/68)  BP(mean): --  RR: 18 (27 Jan 2020 22:00) (18 - 20)  SpO2: 100% (27 Jan 2020 22:00) (100% - 100%)    CONSTITUTIONAL: NAD, well-developed  RESPIRATORY: Normal respiratory effort; lungs are clear to auscultation bilaterally  CARDIOVASCULAR: Regular rate and rhythm, normal S1 and S2, no murmur/rub/gallop; No lower extremity edema; Peripheral pulses are 2+ bilaterally  ABDOMEN: Nontender to palpation, normoactive bowel sounds, no rebound/guarding; No hepatosplenomegaly  MUSCLOSKELETAL: no clubbing or cyanosis of digits; no joint swelling or tenderness to palpation  NEURO: CN 2-12 grossly intact, moves all limbs spontaneously  PSYCH: A+O to person, place, and time; affect appropriate    LABS:                        8.3    5.56  )-----------( 341      ( 28 Jan 2020 07:09 )             28.5     01-28    136  |  101  |  10  ----------------------------<  141<H>  3.9   |  23  |  0.47<L>    Ca    9.0      28 Jan 2020 07:09  Phos  3.7     01-28  Mg     1.8     01-28    TPro  6.3  /  Alb  4.1  /  TBili  0.4  /  DBili  x   /  AST  13  /  ALT  18  /  AlkPhos  89  01-26                RADIOLOGY & ADDITIONAL TESTS:  Results Reviewed:   Imaging Personally Reviewed:  Electrocardiogram Personally Reviewed:    COORDINATION OF CARE:  Care Discussed with Consultants/Other Providers [Y/N]:  Prior or Outpatient Records Reviewed [Y/N]: PROGRESS NOTE:     Patient is a 49y old  Female who presents with a chief complaint of Seizure (27 Jan 2020 09:56)      SUBJECTIVE / OVERNIGHT EVENTS: Patient seen and examined at bedside w/ no subjective complaints. NAEO.        MEDICATIONS  (STANDING):  aspirin enteric coated 81 milliGRAM(s) Oral daily  influenza   Vaccine 0.5 milliLiter(s) IntraMuscular once  multivitamin 1 Tablet(s) Oral daily  omega-3-Acid Ethyl Esters 2 Gram(s) Oral two times a day    MEDICATIONS  (PRN):  acetaminophen   Tablet .. 650 milliGRAM(s) Oral every 6 hours PRN Temp greater or equal to 38C (100.4F), Moderate Pain (4 - 6)      CAPILLARY BLOOD GLUCOSE        I&O's Summary      PHYSICAL EXAM:  Vital Signs Last 24 Hrs  T(C): 36.7 (27 Jan 2020 22:00), Max: 36.7 (27 Jan 2020 22:00)  T(F): 98 (27 Jan 2020 22:00), Max: 98 (27 Jan 2020 22:00)  HR: 73 (27 Jan 2020 22:00) (73 - 89)  BP: 117/60 (27 Jan 2020 22:00) (117/60 - 129/68)  BP(mean): --  RR: 18 (27 Jan 2020 22:00) (18 - 20)  SpO2: 100% (27 Jan 2020 22:00) (100% - 100%)    General appearance: NAD, conversant, afebrile    Eyes: anicteric sclerae, moist conjunctivae; no lid-lag   HENT: Atraumatic; oropharynx clear with moist mucous membranes    Neck: Trachea midline; Full range of motion, supple   Pulm: Lungs clear to auscultation bilaterally, with normal respiratory effort and no intercostal retractions; normal work of breathing   CV: Regular Rhythm and Rate; Normal S1, S2; No murmurs, rubs, or gallops   Abdomen: Soft, non-tender, non-distended   Extremities: No peripheral edema   Psych: Appropriate affect, cooperative; alert and oriented to person, place and time      LABS:                        8.3    5.56  )-----------( 341      ( 28 Jan 2020 07:09 )             28.5     01-28    136  |  101  |  10  ----------------------------<  141<H>  3.9   |  23  |  0.47<L>    Ca    9.0      28 Jan 2020 07:09  Phos  3.7     01-28  Mg     1.8     01-28    TPro  6.3  /  Alb  4.1  /  TBili  0.4  /  DBili  x   /  AST  13  /  ALT  18  /  AlkPhos  89  01-26                RADIOLOGY & ADDITIONAL TESTS:  Results Reviewed:   Imaging Personally Reviewed:  Electrocardiogram Personally Reviewed:    COORDINATION OF CARE:  Care Discussed with Consultants/Other Providers [Y/N]:  Prior or Outpatient Records Reviewed [Y/N]:

## 2020-01-28 NOTE — EEG REPORT - NS EEG TEXT BOX
[facility]   COMPREHENSIVE EPILEPSY CENTER   REPORT OF ROUTINE VIDEO EEG     [facility]: [address], [place] [address], Ph#: [phone]  [facility]: [address], [place] [address], Ph#: [phone]  [facility]: [address] [place] [address], Ph#: [phone]    Patient Name: CLAUDINE GREENBERG  Age and : 49y (72)  MRN #: [id number]  Location: [place] [address] A  Referring Physician: Rahel Xiong    Study Date: 20    _____________________________________________________________  TECHNICAL INFORMATION    Placement and Labeling of Electrodes:  The EEG was performed utilizing 20 channels referential EEG connections (coronal over temporal over parasagittal montage) using all standard 10-20 electrode placements with EKG.  Recording was at a sampling rate of 256 samples per second per channel.  Time synchronized digital video recording was done simultaneously with EEG recording.  A low light infrared camera was used for low light recording.  Dre and seizure detection algorithms were utilized.    _____________________________________________________________  HISTORY    Patient is a 49y old  Female who presents with a chief complaint of Seizure (2020 09:42)      PERTINENT MEDICATION: None     _____________________________________________________________  STUDY INTERPRETATION    Findings: The background was continuous, spontaneously variable and reactive. During wakefulness, the posterior dominant rhythm consisted of symmetric, well-modulated 12 Hz activity, with amplitude to 30 uV, that attenuated to eye opening.  Low amplitude frontal beta was noted in wakefulness.    Background Slowing:  No generalized background slowing was present.    Focal Slowing:   None were present.    Sleep Background:  Drowsiness was characterized by fragmentation, attenuation, and slowing of the background activity.    Sleep was characterized by the presence of vertex waves, symmetric sleep spindles and K-complexes.    Other Non-Epileptiform Findings:  None were present.    Interictal Epileptiform Activity:   None were present.    Events:  Clinical events: None recorded.  Seizures: None recorded.    Activation Procedures:   Photic stimulation was not performed.   Hyperventilation was performed and did not elicit any abnormality.    Artifacts:  Intermittent myogenic and movement artifacts were noted.    ECG:  The heart rate on single channel ECG was predominantly between 60-80 BPM.    _____________________________________________________________  EEG SUMMARY/CLASSIFICATION    Normal EEG in the awake, drowsy and asleep states.    _____________________________________________________________  EEG IMPRESSION/CLINICAL CORRELATE    Normal EEG study.  No epileptiform pattern or seizure seen.    _____________________________________________________________    Preliminary Fellow Interpretation:     Caridad Mccann MD  Epilepsy Fellow [facility]   COMPREHENSIVE EPILEPSY CENTER   REPORT OF ROUTINE VIDEO EEG     [facility]: [address], [place] [address], Ph#: [phone]  [place]: [address], [place] [address], Ph#: [phone]  [facility]: [address] [place] [address], Ph#: [phone]    Patient Name: CLAUDINE GREENBERG  Age and : 49y (72)  MRN #: [id number]  Location: [place] [address] A  Referring Physician: Rahel Xiong    Study Date: 20    _____________________________________________________________  TECHNICAL INFORMATION    Placement and Labeling of Electrodes:  The EEG was performed utilizing 20 channels referential EEG connections (coronal over temporal over parasagittal montage) using all standard 10-20 electrode placements with EKG.  Recording was at a sampling rate of 256 samples per second per channel.  Time synchronized digital video recording was done simultaneously with EEG recording.  A low light infrared camera was used for low light recording.  Dre and seizure detection algorithms were utilized.    _____________________________________________________________  HISTORY    Patient is a 49y old  Female who presents with a chief complaint of Seizure (2020 09:42)      PERTINENT MEDICATION:   None     _____________________________________________________________  STUDY INTERPRETATION    Findings: The background was continuous, spontaneously variable and reactive. During wakefulness, the posterior dominant rhythm consisted of symmetric, well-modulated 12 Hz activity, with amplitude to 30 uV, that attenuated to eye opening.  Low amplitude frontal beta was noted in wakefulness.    Background Slowing:  No generalized background slowing was present.    Focal Slowing:   None were present.    Sleep Background:  Drowsiness was characterized by fragmentation, attenuation, and slowing of the background activity.    Sleep was characterized by the presence of vertex waves, symmetric sleep spindles and K-complexes.    Other Non-Epileptiform Findings:  None were present.    Interictal Epileptiform Activity:   None were present.    Events:  Clinical events: None recorded.  Seizures: None recorded.    Activation Procedures:   Photic stimulation was not performed.   Hyperventilation was performed and did not elicit any abnormality.    Artifacts:  Intermittent myogenic and movement artifacts were noted.    ECG:  The heart rate on single channel ECG was predominantly between 60-80 BPM.    _____________________________________________________________  EEG SUMMARY/CLASSIFICATION    Normal EEG in the awake, drowsy and asleep states.    _____________________________________________________________  EEG IMPRESSION/CLINICAL CORRELATE    Normal EEG study.  No epileptiform pattern or seizure seen.    _____________________________________________________________    Caridad Mccann MD  Epilepsy Fellow    Korin Bell MD  Attending Physician, [facility] [place]

## 2020-01-28 NOTE — PROGRESS NOTE ADULT - PROBLEM SELECTOR PLAN 5
Dispo- seizure w/u as per neuro; likely d/c home w/ outpt neuro follow up    1.  Name of PCP:  2.  PCP Contacted on Admission: [ ] Y    [ ] N    3.  PCP contacted at Discharge: [ ] Y    [ ] N    [ ] N/A  4.  Post-Discharge Appointment Date and Location:  5.  Summary of Handoff given to PCP: Dispo- seizure w/u as per neuro; likely d/c home w/ outpt neuro follow up    1.  Name of PCP: Dr. Stephanie Gutierrez [phone]  2.  PCP Contacted on Admission: [ ] Y    [ x] N    3.  PCP contacted at Discharge: [ ] Y    [ ] N    [ ] N/A  4.  Post-Discharge Appointment Date and Location:  5.  Summary of Handoff given to PCP: Y Dispo- seizure w/u as per neuro; likely d/c home w/ outpt neuro follow up    1.  Name of PCP: Dr. Stephanie Gutierrez [phone]  2.  PCP Contacted on Admission: [ ] Y    [ x] N    3.  PCP contacted at Discharge: [ ] Y    [ ] N    [ ] N/A  4.  Post-Discharge Appointment Date and Location:  5.  Summary of Handoff given to PCP: Y    Dr. Rene Brown, Hand Surgeon of [facility]: [phone]  Ob Gyn: [phone]

## 2020-01-28 NOTE — PROGRESS NOTE ADULT - ASSESSMENT
48 y/o F w/ Mhx of MVP and syncope p/w and seizure most likely neurological in origin due to unknown etiology 49F w/ Mhx of MVP and syncope p/w and seizure most likely neurological in origin due to unknown etiology

## 2020-01-28 NOTE — PROGRESS NOTE ADULT - PROBLEM SELECTOR PLAN 1
Seizure due to unknown etiology, most likely neurological in origin  -No clinical suspicion for infectious/metabolic etiology given afebrile/asymptomatic prior   -No clinical suspicion for metabolic origin given absence of metabolic derangements; utox neg  -CTA head/neck/brain negative for acute pathology  -neuro c/s; 24 hr EEG and MR brain (pend due to metal plate and esure f/u)   -pend echo

## 2020-01-28 NOTE — PROGRESS NOTE ADULT - ATTENDING COMMENTS
Patient seen and examined by me. Case discussed with resident and agree with the resident's findings and plan as documented in the resident's note. 49F h/o MVP and syncope p/w seizure vs. syncope followed by seizure.   -f/u neuro recs  -f/u video EEG  -f/u MRI brain -- awaiting possible right wrist determination of hardware implantation  -patient reports having a permanently implanted birth control device (called Essure) is MRI compatible. Patient seen and examined by me. Case discussed with resident and agree with the resident's findings and plan as documented in the resident's note. 49F h/o MVP and syncope p/w seizure vs. syncope followed by seizure.   -f/u neuro recs  -f/u video EEG  -f/u MRI brain -- awaiting possible right wrist determination of hardware implantation: will contact Felix at [phone]  -patient reports having a permanently implanted birth control device (called Essure) which is MRI compatible.

## 2020-01-29 LAB
ANION GAP SERPL CALC-SCNC: 11 MMO/L — SIGNIFICANT CHANGE UP (ref 7–14)
APPEARANCE UR: SIGNIFICANT CHANGE UP
BILIRUB UR-MCNC: NEGATIVE — SIGNIFICANT CHANGE UP
BLOOD UR QL VISUAL: HIGH
BUN SERPL-MCNC: 18 MG/DL — SIGNIFICANT CHANGE UP (ref 7–23)
CALCIUM SERPL-MCNC: 8.9 MG/DL — SIGNIFICANT CHANGE UP (ref 8.4–10.5)
CHLORIDE SERPL-SCNC: 98 MMOL/L — SIGNIFICANT CHANGE UP (ref 98–107)
CO2 SERPL-SCNC: 22 MMOL/L — SIGNIFICANT CHANGE UP (ref 22–31)
COLOR SPEC: SIGNIFICANT CHANGE UP
CREAT SERPL-MCNC: 0.74 MG/DL — SIGNIFICANT CHANGE UP (ref 0.5–1.3)
EPI CELLS # UR: SIGNIFICANT CHANGE UP
GLUCOSE SERPL-MCNC: 149 MG/DL — HIGH (ref 70–99)
GLUCOSE UR-MCNC: NEGATIVE — SIGNIFICANT CHANGE UP
HCT VFR BLD CALC: 29.6 % — LOW (ref 34.5–45)
HGB BLD-MCNC: 8.7 G/DL — LOW (ref 11.5–15.5)
KETONES UR-MCNC: NEGATIVE — SIGNIFICANT CHANGE UP
LEUKOCYTE ESTERASE UR-ACNC: NEGATIVE — SIGNIFICANT CHANGE UP
MAGNESIUM SERPL-MCNC: 1.9 MG/DL — SIGNIFICANT CHANGE UP (ref 1.6–2.6)
MCHC RBC-ENTMCNC: 21.9 PG — LOW (ref 27–34)
MCHC RBC-ENTMCNC: 29.4 % — LOW (ref 32–36)
MCV RBC AUTO: 74.4 FL — LOW (ref 80–100)
NITRITE UR-MCNC: NEGATIVE — SIGNIFICANT CHANGE UP
NRBC # FLD: 0 K/UL — SIGNIFICANT CHANGE UP (ref 0–0)
PH UR: 6 — SIGNIFICANT CHANGE UP (ref 5–8)
PHOSPHATE SERPL-MCNC: 3.6 MG/DL — SIGNIFICANT CHANGE UP (ref 2.5–4.5)
PLATELET # BLD AUTO: 362 K/UL — SIGNIFICANT CHANGE UP (ref 150–400)
PMV BLD: 10.2 FL — SIGNIFICANT CHANGE UP (ref 7–13)
POTASSIUM SERPL-MCNC: 4 MMOL/L — SIGNIFICANT CHANGE UP (ref 3.5–5.3)
POTASSIUM SERPL-SCNC: 4 MMOL/L — SIGNIFICANT CHANGE UP (ref 3.5–5.3)
PROT UR-MCNC: 20 — SIGNIFICANT CHANGE UP
RBC # BLD: 3.98 M/UL — SIGNIFICANT CHANGE UP (ref 3.8–5.2)
RBC # FLD: 17.1 % — HIGH (ref 10.3–14.5)
RBC CASTS # UR COMP ASSIST: >50 — HIGH (ref 0–?)
SODIUM SERPL-SCNC: 131 MMOL/L — LOW (ref 135–145)
SP GR SPEC: 1.01 — SIGNIFICANT CHANGE UP (ref 1–1.04)
UROBILINOGEN FLD QL: NORMAL — SIGNIFICANT CHANGE UP
WBC # BLD: 6.44 K/UL — SIGNIFICANT CHANGE UP (ref 3.8–10.5)
WBC # FLD AUTO: 6.44 K/UL — SIGNIFICANT CHANGE UP (ref 3.8–10.5)

## 2020-01-29 PROCEDURE — 95720 EEG PHY/QHP EA INCR W/VEEG: CPT

## 2020-01-29 PROCEDURE — 99233 SBSQ HOSP IP/OBS HIGH 50: CPT | Mod: GC

## 2020-01-29 RX ADMIN — Medication 2 GRAM(S): at 18:11

## 2020-01-29 RX ADMIN — Medication 1 TABLET(S): at 12:06

## 2020-01-29 RX ADMIN — Medication 81 MILLIGRAM(S): at 12:06

## 2020-01-29 RX ADMIN — Medication 2 GRAM(S): at 05:58

## 2020-01-29 NOTE — EEG REPORT - NS EEG TEXT BOX
[facility]   COMPREHENSIVE EPILEPSY CENTER   REPORT OF CONTINUOUS VIDEO EEG     [facility]: [address], [place] [address]  [facility]: [address] [place] [id number]  [facility]: [address] [place] [address]    Patient Name: CLAUDINE GREENBERG  Age and : 49y (72)  MRN #: [id number]  Location: [place] [address] A  Referring Physician: Rahel Xiong    Start Time/Date: 15:31 on 20  End Time/Date: 08:00 on 20    _____________________________________________________________  STUDY INFORMATION    EEG Recording Technique:  The patient underwent continuous Video-EEG monitoring, using Telemetry System hardware on the XLTek Digital System. EEG and video data were stored on a computer hard drive with important events saved in digital archive files. The material was reviewed by a physician (electroencephalographer / epileptologist) on a daily basis. Dre and seizure detection algorithms were utilized and reviewed. An EEG Technician attended to the patient, and was available throughout daytime work hours.  The epilepsy center neurologist was available in person or on call 24-hours per day.    EEG Placement and Labeling of Electrodes:  The EEG was performed utilizing 20 channel referential EEG connections (coronal over temporal over parasagittal montage) using all standard 10-20 electrode placements with EKG, with additional electrodes placed in the inferior temporal region using the modified 10-10 montage electrode placements for elective admissions, or if deemed necessary. Recording was at a sampling rate of 256 samples per second per channel. Time synchronized digital video recording was done simultaneously with EEG recording. A low light infrared camera was used for low light recording.     _____________________________________________________________  HISTORY    Patient is a 49y old  Female who presents with a chief complaint of Seizure (2020 10:19)      PERTINENT MEDICATION: None     _____________________________________________________________  STUDY INTERPRETATION    Findings: The background was continuous, spontaneously variable and reactive. During wakefulness, the posterior dominant rhythm consisted of symmetric, well-modulated 12 Hz activity, with amplitude to 30 uV, that attenuated to eye opening.  Low amplitude frontal beta was noted in wakefulness.    Background Slowing:  No generalized background slowing was present.    Focal Slowing:   None were present.    Sleep Background:  Drowsiness was characterized by fragmentation, attenuation, and slowing of the background activity.    Sleep was characterized by the presence of vertex waves, symmetric sleep spindles and K-complexes.    Other Non-Epileptiform Findings:  None were present.    Interictal Epileptiform Activity:   None were present.    Events:  Clinical events: None recorded.  Seizures: None recorded.    Activation Procedures:   Photic stimulation was not performed.   Hyperventilation was not performed.     Artifacts:  Intermittent myogenic and movement artifacts were noted.    ECG:  The heart rate on single channel ECG was predominantly between 60-80 BPM.    _____________________________________________________________  EEG SUMMARY/CLASSIFICATION    Normal EEG in the awake, drowsy and asleep states.    _____________________________________________________________  EEG IMPRESSION/CLINICAL CORRELATE    Normal EEG study.  No epileptiform pattern or seizure seen.    _____________________________________________________________    Preliminary Fellow Interpretation:     Caridad Mccann MD  Epilepsy Fellow [facility]   COMPREHENSIVE EPILEPSY CENTER   REPORT OF CONTINUOUS VIDEO EEG     [facility]: [address], [place] [address]  [facility]: [address] [place] [address]  [facility]: [address] [place] [address]    Patient Name: CLAUDINE GREENBERG  Age and : 49y (72)  MRN #: [id number]  Location: [place] [address] A  Referring Physician: Rahel Xiong    Start Time/Date: 15:31 on 20  End Time/Date: 08:00 on 20    _____________________________________________________________  STUDY INFORMATION    EEG Recording Technique:  The patient underwent continuous Video-EEG monitoring, using Telemetry System hardware on the XLTek Digital System. EEG and video data were stored on a computer hard drive with important events saved in digital archive files. The material was reviewed by a physician (electroencephalographer / epileptologist) on a daily basis. Dre and seizure detection algorithms were utilized and reviewed. An EEG Technician attended to the patient, and was available throughout daytime work hours.  The epilepsy center neurologist was available in person or on call 24-hours per day.    EEG Placement and Labeling of Electrodes:  The EEG was performed utilizing 20 channel referential EEG connections (coronal over temporal over parasagittal montage) using all standard 10-20 electrode placements with EKG, with additional electrodes placed in the inferior temporal region using the modified 10-10 montage electrode placements for elective admissions, or if deemed necessary. Recording was at a sampling rate of 256 samples per second per channel. Time synchronized digital video recording was done simultaneously with EEG recording. A low light infrared camera was used for low light recording.     _____________________________________________________________  HISTORY    Patient is a 49y old  Female who presents with a chief complaint of Seizure (2020 10:19)      PERTINENT MEDICATION: None     _____________________________________________________________  STUDY INTERPRETATION    Findings: The background was continuous, spontaneously variable and reactive. During wakefulness, the posterior dominant rhythm consisted of symmetric, well-modulated 12 Hz activity, with amplitude to 30 uV, that attenuated to eye opening.  Low amplitude frontal beta was noted in wakefulness.    Background Slowing:  No generalized background slowing was present.    Focal Slowing:   None were present.    Sleep Background:  Drowsiness was characterized by fragmentation, attenuation, and slowing of the background activity.    Sleep was characterized by the presence of vertex waves, symmetric sleep spindles and K-complexes.    Other Non-Epileptiform Findings:  None were present.    Interictal Epileptiform Activity:   None were present.    Events:  Clinical events: None recorded.  Seizures: None recorded.    Activation Procedures:   Photic stimulation was not performed.   Hyperventilation was not performed.     Artifacts:  Intermittent myogenic and movement artifacts were noted.    ECG:  The heart rate on single channel ECG was predominantly between 60-80 BPM.    _____________________________________________________________  EEG SUMMARY/CLASSIFICATION    Normal EEG in the awake, drowsy and asleep states.    _____________________________________________________________  EEG IMPRESSION/CLINICAL CORRELATE    Normal EEG study.  No epileptiform pattern or seizure seen.    _____________________________________________________________    Caridad Mccann MD  Epilepsy Fellow    Korin Bell MD  Attending Physician, [facility] [place]

## 2020-01-29 NOTE — PROGRESS NOTE ADULT - PROBLEM SELECTOR PLAN 5
Dispo- seizure w/u as per neuro; likely d/c home w/ outpt neuro follow up    1.  Name of PCP: Dr. Stephanie Gutierrez [phone]  2.  PCP Contacted on Admission: [ ] Y    [ x] N    3.  PCP contacted at Discharge: [ ] Y    [ ] N    [ ] N/A  4.  Post-Discharge Appointment Date and Location:  5.  Summary of Handoff given to PCP: Y    Dr. Rene Brown, Hand Surgeon of [facility]: [phone]  Ob Gyn: [phone]

## 2020-01-29 NOTE — PROGRESS NOTE ADULT - PROBLEM SELECTOR PLAN 2
Current hb 8.3; 13 on last admission  -H&H stable  -no active sources of bleeding; will cont to monitor   -Transfuse PRN if Hb<7 Current hb 8.7; 13 on last admission  -H&H stable  -no active sources of bleeding; will cont to monitor   -Transfuse PRN if Hb<7 Current hb 8.7; 13 on last admission  - f/u iron studies  - will contact PCP about poss prior anemia w/u   - out patient GI f/u  - outpatient ob gyn to discuss essure removal Current hb 8.7; 13 on last admission  - f/u iron studies  - will contact PCP about poss prior anemia w/u   - out patient GI f/u for colonoscopy screening due to anemia  - outpatient ob gyn to discuss essure removal

## 2020-01-29 NOTE — PROGRESS NOTE ADULT - PROBLEM SELECTOR PLAN 1
Seizure due to unknown etiology, most likely neurological in origin  -No clinical suspicion for infectious/metabolic etiology given afebrile/asymptomatic prior   -No clinical suspicion for metabolic origin given absence of metabolic derangements; utox neg  -CTA head/neck/brain negative for acute pathology  -neuro c/s; 24 hr EEG and MR brain (pend due to metal plate and esure f/u)   -pend echo Seizure due to unknown etiology, most likely neurological in origin  -No clinical suspicion for infectious/metabolic etiology given afebrile/asymptomatic prior   -No clinical suspicion for metabolic origin given absence of metabolic derangements; utox neg  -CTA head/neck/brain negative for acute pathology  -f/u 24 hr EEG  -MR brain pend due to metal plate   -pend echo Seizure due to unknown etiology, most likely neurological in origin  -No clinical suspicion for infectious/metabolic etiology given afebrile/asymptomatic prior   -No clinical suspicion for metabolic origin given absence of metabolic derangements; utox neg  -CTA head/neck/brain negative for acute pathology  -f/u 24 hr EEG  -MR brain pend due to metal plate; will contact orthopedist about hardware compatibility   -TTE normal Seizure due to unknown etiology, most likely neurological in origin  -f/u 24 hr EEG  -MR brain pend due to metal plate; will contact orthopedist about hardware compatibility  -No clinical suspicion for infectious/metabolic etiology given afebrile/asymptomatic prior   -No clinical suspicion for metabolic origin given absence of metabolic derangements; utox neg  -CTA head/neck/brain negative for acute pathology  -TTE normal

## 2020-01-29 NOTE — PROGRESS NOTE ADULT - SUBJECTIVE AND OBJECTIVE BOX
PROGRESS NOTE:     Patient is a 49y old  Female who presents with a chief complaint of Seizure (28 Jan 2020 09:42)      SUBJECTIVE / OVERNIGHT EVENTS:        MEDICATIONS  (STANDING):  aspirin enteric coated 81 milliGRAM(s) Oral daily  influenza   Vaccine 0.5 milliLiter(s) IntraMuscular once  multivitamin 1 Tablet(s) Oral daily  omega-3-Acid Ethyl Esters 2 Gram(s) Oral two times a day    MEDICATIONS  (PRN):  acetaminophen   Tablet .. 650 milliGRAM(s) Oral every 6 hours PRN Temp greater or equal to 38C (100.4F), Moderate Pain (4 - 6)      CAPILLARY BLOOD GLUCOSE        I&O's Summary      PHYSICAL EXAM:  Vital Signs Last 24 Hrs  T(C): 36.6 (29 Jan 2020 05:59), Max: 36.6 (28 Jan 2020 21:16)  T(F): 97.9 (29 Jan 2020 05:59), Max: 97.9 (28 Jan 2020 21:16)  HR: 76 (29 Jan 2020 05:59) (71 - 76)  BP: 110/56 (29 Jan 2020 05:59) (105/56 - 110/56)  BP(mean): --  RR: 16 (29 Jan 2020 05:59) (16 - 19)  SpO2: 100% (29 Jan 2020 05:59) (98% - 100%)    CONSTITUTIONAL: NAD, well-developed  RESPIRATORY: Normal respiratory effort; lungs are clear to auscultation bilaterally  CARDIOVASCULAR: Regular rate and rhythm, normal S1 and S2, no murmur/rub/gallop; No lower extremity edema; Peripheral pulses are 2+ bilaterally  ABDOMEN: Nontender to palpation, normoactive bowel sounds, no rebound/guarding; No hepatosplenomegaly  MUSCLOSKELETAL: no clubbing or cyanosis of digits; no joint swelling or tenderness to palpation  NEURO: CN 2-12 grossly intact, moves all limbs spontaneously  PSYCH: A+O to person, place, and time; affect appropriate    LABS:                        8.7    6.44  )-----------( 362      ( 29 Jan 2020 06:45 )             29.6     01-29    131<L>  |  98  |  18  ----------------------------<  149<H>  4.0   |  22  |  0.74    Ca    8.9      29 Jan 2020 06:45  Phos  3.6     01-29  Mg     1.9     01-29                  RADIOLOGY & ADDITIONAL TESTS:  Results Reviewed:   Imaging Personally Reviewed:  Electrocardiogram Personally Reviewed:    COORDINATION OF CARE:  Care Discussed with Consultants/Other Providers [Y/N]:  Prior or Outpatient Records Reviewed [Y/N]: PROGRESS NOTE:     Patient is a 49y old  Female who presents with a chief complaint of Seizure (28 Jan 2020 09:42)      SUBJECTIVE / OVERNIGHT EVENTS: Patient seen and examined at bedside w/ no subjective complaints. NAEO        MEDICATIONS  (STANDING):  aspirin enteric coated 81 milliGRAM(s) Oral daily  influenza   Vaccine 0.5 milliLiter(s) IntraMuscular once  multivitamin 1 Tablet(s) Oral daily  omega-3-Acid Ethyl Esters 2 Gram(s) Oral two times a day    MEDICATIONS  (PRN):  acetaminophen   Tablet .. 650 milliGRAM(s) Oral every 6 hours PRN Temp greater or equal to 38C (100.4F), Moderate Pain (4 - 6)      CAPILLARY BLOOD GLUCOSE        I&O's Summary      PHYSICAL EXAM:  Vital Signs Last 24 Hrs  T(C): 36.6 (29 Jan 2020 05:59), Max: 36.6 (28 Jan 2020 21:16)  T(F): 97.9 (29 Jan 2020 05:59), Max: 97.9 (28 Jan 2020 21:16)  HR: 76 (29 Jan 2020 05:59) (71 - 76)  BP: 110/56 (29 Jan 2020 05:59) (105/56 - 110/56)  BP(mean): --  RR: 16 (29 Jan 2020 05:59) (16 - 19)  SpO2: 100% (29 Jan 2020 05:59) (98% - 100%)    General appearance: NAD, conversant, afebrile    Eyes: anicteric sclerae, moist conjunctivae; no lid-lag; Pupils equal, round and reactive to light; Extraocular muscles intact   HENT: Atraumatic; oropharynx clear with moist mucous membranes   Neck: Trachea midline; Full range of motion, supple   Pulm: Lungs clear to auscultation bilaterally, with normal respiratory effort and no intercostal retractions; normal work of breathing   CV: Regular Rhythm and Rate; Normal S1, S2; No murmurs, rubs, or gallops   Abdomen: Soft, non-tender, non-distended   Extremities: No peripheral edema    Skin: Normal temperature, turgor and texture; no rash, ulcers or subcutaneous nodules   Psych: Appropriate affect, cooperative; alert and oriented to person, place and time    LABS:                        8.7    6.44  )-----------( 362      ( 29 Jan 2020 06:45 )             29.6     01-29    131<L>  |  98  |  18  ----------------------------<  149<H>  4.0   |  22  |  0.74    Ca    8.9      29 Jan 2020 06:45  Phos  3.6     01-29  Mg     1.9     01-29        RADIOLOGY & ADDITIONAL TESTS:  Results Reviewed: 1/28 EEG normal, no seizure  Imaging Personally Reviewed:  Electrocardiogram Personally Reviewed:    COORDINATION OF CARE:  Care Discussed with Consultants/Other Providers [Y/N]:  Prior or Outpatient Records Reviewed [Y/N]: PROGRESS NOTE:     Patient is a 49y old  Female who presents with a chief complaint of Seizure (28 Jan 2020 09:42)      SUBJECTIVE / OVERNIGHT EVENTS: Patient seen and examined at bedside w/ no subjective complaints. NAEO        MEDICATIONS  (STANDING):  aspirin enteric coated 81 milliGRAM(s) Oral daily  influenza   Vaccine 0.5 milliLiter(s) IntraMuscular once  multivitamin 1 Tablet(s) Oral daily  omega-3-Acid Ethyl Esters 2 Gram(s) Oral two times a day    MEDICATIONS  (PRN):  acetaminophen   Tablet .. 650 milliGRAM(s) Oral every 6 hours PRN Temp greater or equal to 38C (100.4F), Moderate Pain (4 - 6)      CAPILLARY BLOOD GLUCOSE        I&O's Summary      PHYSICAL EXAM:  Vital Signs Last 24 Hrs  T(C): 36.6 (29 Jan 2020 05:59), Max: 36.6 (28 Jan 2020 21:16)  T(F): 97.9 (29 Jan 2020 05:59), Max: 97.9 (28 Jan 2020 21:16)  HR: 76 (29 Jan 2020 05:59) (71 - 76)  BP: 110/56 (29 Jan 2020 05:59) (105/56 - 110/56)  BP(mean): --  RR: 16 (29 Jan 2020 05:59) (16 - 19)  SpO2: 100% (29 Jan 2020 05:59) (98% - 100%)    General appearance: NAD, conversant, afebrile    Eyes: anicteric sclerae, moist conjunctivae; no lid-lag; Pupils equal, round and reactive to light; Extraocular muscles intact   HENT: Atraumatic; oropharynx clear with moist mucous membranes   Neck: Trachea midline; Full range of motion, supple   Pulm: Lungs clear to auscultation bilaterally, with normal respiratory effort and no intercostal retractions; normal work of breathing   CV: Regular Rhythm and Rate; Normal S1, S2; No murmurs, rubs, or gallops   Abdomen: Soft, non-tender, non-distended   Extremities: No peripheral edema    Psych: Appropriate affect, cooperative; alert and oriented to person, place and time    LABS:                        8.7    6.44  )-----------( 362      ( 29 Jan 2020 06:45 )             29.6     01-29    131<L>  |  98  |  18  ----------------------------<  149<H>  4.0   |  22  |  0.74    Ca    8.9      29 Jan 2020 06:45  Phos  3.6     01-29  Mg     1.9     01-29        RADIOLOGY & ADDITIONAL TESTS:  Results Reviewed: 1/28 EEG normal, no seizure  Imaging Personally Reviewed:  Electrocardiogram Personally Reviewed:    COORDINATION OF CARE:  Care Discussed with Consultants/Other Providers [Y/N]:  Prior or Outpatient Records Reviewed [Y/N]:

## 2020-01-29 NOTE — PROGRESS NOTE ADULT - ASSESSMENT
49F w/ Mhx of MVP and syncope p/w and seizure most likely neurological in origin due to unknown etiology

## 2020-01-29 NOTE — PROGRESS NOTE ADULT - ATTENDING COMMENTS
Patient seen and examined by me. Case discussed with resident and agree with the resident's findings and plan as documented in the resident's note. 49F h/o MVP and syncope p/w seizure vs. syncope followed by seizure.   -f/u neuro recs  -video EEG - reports nL  -2D-Echo reviewed and WNL  -patient reports having a permanently implanted birth control device (called Essure) which is MRI compatible.  -f/u MRI brain -- awaiting possible right wrist determination of hardware implantation: will contact [facility] at [phone] (Dr. Rene Brown)  -anemia: f/u iron studies; repeat UA and urine cytology  -will obtain HCM history from patient Patient seen and examined by me. Case discussed with resident and agree with the resident's findings and plan as documented in the resident's note. 49F h/o MVP and syncope p/w seizure vs. syncope followed by seizure.     1. Seizure vs. Syncope  -video EEG - reports nL  -2D-Echo reviewed and WNL  -f/u neuro recs  -patient reports having a permanently implanted birth control device (called Essure) which is MRI compatible.  -f/u MRI brain -- awaiting possible right wrist determination of hardware implantation: will contact [facility] at [phone] (Dr. Rene Brown)  -will obtain HCM history from patient    2. Anemia:  -patient reports menorrhagia increased since having her Essure placed back in 2011, but patient's Hgb was normal (13.3) back in Dec 2018  -patient also admits to having hemorrhoids "which do bleed at times"  -f/u iron studies  -repeat UA and check urine cytology  -will need outpatient GI follow-up

## 2020-01-30 ENCOUNTER — TRANSCRIPTION ENCOUNTER (OUTPATIENT)
Age: 50
End: 2020-01-30

## 2020-01-30 VITALS
RESPIRATION RATE: 17 BRPM | DIASTOLIC BLOOD PRESSURE: 57 MMHG | OXYGEN SATURATION: 100 % | TEMPERATURE: 98 F | HEART RATE: 75 BPM | SYSTOLIC BLOOD PRESSURE: 109 MMHG

## 2020-01-30 LAB
ANION GAP SERPL CALC-SCNC: 12 MMO/L — SIGNIFICANT CHANGE UP (ref 7–14)
BUN SERPL-MCNC: 11 MG/DL — SIGNIFICANT CHANGE UP (ref 7–23)
CALCIUM SERPL-MCNC: 9 MG/DL — SIGNIFICANT CHANGE UP (ref 8.4–10.5)
CHLORIDE SERPL-SCNC: 101 MMOL/L — SIGNIFICANT CHANGE UP (ref 98–107)
CO2 SERPL-SCNC: 23 MMOL/L — SIGNIFICANT CHANGE UP (ref 22–31)
CREAT SERPL-MCNC: 0.58 MG/DL — SIGNIFICANT CHANGE UP (ref 0.5–1.3)
FERRITIN SERPL-MCNC: 7.19 NG/ML — LOW (ref 15–150)
GLUCOSE SERPL-MCNC: 129 MG/DL — HIGH (ref 70–99)
HCT VFR BLD CALC: 29.2 % — LOW (ref 34.5–45)
HGB BLD-MCNC: 8.5 G/DL — LOW (ref 11.5–15.5)
IRON SATN MFR SERPL: 14 UG/DL — LOW (ref 30–160)
IRON SATN MFR SERPL: 366 UG/DL — SIGNIFICANT CHANGE UP (ref 140–530)
MAGNESIUM SERPL-MCNC: 1.9 MG/DL — SIGNIFICANT CHANGE UP (ref 1.6–2.6)
MCHC RBC-ENTMCNC: 21.3 PG — LOW (ref 27–34)
MCHC RBC-ENTMCNC: 29.1 % — LOW (ref 32–36)
MCV RBC AUTO: 73 FL — LOW (ref 80–100)
NRBC # FLD: 0 K/UL — SIGNIFICANT CHANGE UP (ref 0–0)
PHOSPHATE SERPL-MCNC: 3.6 MG/DL — SIGNIFICANT CHANGE UP (ref 2.5–4.5)
PLATELET # BLD AUTO: 367 K/UL — SIGNIFICANT CHANGE UP (ref 150–400)
PMV BLD: 10.3 FL — SIGNIFICANT CHANGE UP (ref 7–13)
POTASSIUM SERPL-MCNC: 4 MMOL/L — SIGNIFICANT CHANGE UP (ref 3.5–5.3)
POTASSIUM SERPL-SCNC: 4 MMOL/L — SIGNIFICANT CHANGE UP (ref 3.5–5.3)
RBC # BLD: 4 M/UL — SIGNIFICANT CHANGE UP (ref 3.8–5.2)
RBC # FLD: 17.1 % — HIGH (ref 10.3–14.5)
SODIUM SERPL-SCNC: 136 MMOL/L — SIGNIFICANT CHANGE UP (ref 135–145)
TRANSFERRIN SERPL-MCNC: 324 MG/DL — SIGNIFICANT CHANGE UP (ref 200–360)
UIBC SERPL-MCNC: 352.1 UG/DL — SIGNIFICANT CHANGE UP (ref 110–370)
VIT B12 SERPL-MCNC: 835 PG/ML — SIGNIFICANT CHANGE UP (ref 200–900)
WBC # BLD: 6.08 K/UL — SIGNIFICANT CHANGE UP (ref 3.8–10.5)
WBC # FLD AUTO: 6.08 K/UL — SIGNIFICANT CHANGE UP (ref 3.8–10.5)

## 2020-01-30 PROCEDURE — 70551 MRI BRAIN STEM W/O DYE: CPT | Mod: 26

## 2020-01-30 PROCEDURE — 99239 HOSP IP/OBS DSCHRG MGMT >30: CPT

## 2020-01-30 RX ORDER — DOCUSATE SODIUM 100 MG
1 CAPSULE ORAL
Qty: 30 | Refills: 0
Start: 2020-01-30 | End: 2020-02-28

## 2020-01-30 RX ORDER — FERROUS SULFATE 325(65) MG
325 TABLET ORAL DAILY
Refills: 0 | Status: DISCONTINUED | OUTPATIENT
Start: 2020-01-30 | End: 2020-01-30

## 2020-01-30 RX ORDER — ACETAMINOPHEN 500 MG
2 TABLET ORAL
Qty: 0 | Refills: 0 | DISCHARGE

## 2020-01-30 RX ORDER — SENNA PLUS 8.6 MG/1
10 TABLET ORAL
Qty: 300 | Refills: 0
Start: 2020-01-30 | End: 2020-02-28

## 2020-01-30 RX ORDER — FERROUS SULFATE 325(65) MG
1 TABLET ORAL
Qty: 90 | Refills: 0
Start: 2020-01-30 | End: 2020-02-28

## 2020-01-30 RX ORDER — IBUPROFEN 200 MG
1 TABLET ORAL
Qty: 0 | Refills: 0 | DISCHARGE

## 2020-01-30 RX ADMIN — Medication 81 MILLIGRAM(S): at 11:30

## 2020-01-30 RX ADMIN — Medication 1 TABLET(S): at 11:30

## 2020-01-30 RX ADMIN — Medication 2 GRAM(S): at 05:25

## 2020-01-30 NOTE — DISCHARGE NOTE PROVIDER - PROVIDER TOKENS
PROVIDER:[TOKEN:[[id number]],SCHEDULEDAPPT:[02/03/2020],SCHEDULEDAPPTTIME:[12:15 PM]],FREE:[LAST:[Mina],FIRST:[Ruma],PHONE:[phone]],FAX:[(   )    -],ADDRESS:[[address] [place] [address]],SCHEDULEDAPPT:[02/10/2020],SCHEDULEDAPPTTIME:[02:45 PM],ESTABLISHEDPATIENT:[T]]

## 2020-01-30 NOTE — PROGRESS NOTE ADULT - ASSESSMENT
49F w/ Mhx of MVP and syncope p/w and seizure most likely neurological in origin due to unknown etiology 49F with h/o MVP and frequent syncopal episodes p/w most likely seizure-like syncope (aka convulsive syncope).

## 2020-01-30 NOTE — PROGRESS NOTE ADULT - PROBLEM SELECTOR PLAN 1
Seizure due to unknown etiology, most likely neurological in origin  -f/u 24 hr EEG  -MR brain pend due to metal plate; will contact orthopedist about hardware compatibility  -No clinical suspicion for infectious/metabolic etiology given afebrile/asymptomatic prior   -No clinical suspicion for metabolic origin given absence of metabolic derangements; utox neg  -CTA head/neck/brain negative for acute pathology  -TTE normal Seizure due to unknown etiology, most likely neurological in origin  -MR brain performed this am, will f/u read  -No clinical suspicion for infectious/metabolic etiology given afebrile/asymptomatic prior   -No clinical suspicion for metabolic origin given absence of metabolic derangements; utox neg  -CTA head/neck/brain negative for acute pathology  -TTE normal  -EEG normal, no seizure activity

## 2020-01-30 NOTE — DISCHARGE NOTE NURSING/CASE MANAGEMENT/SOCIAL WORK - PATIENT PORTAL LINK FT
You can access the FollowMyHealth Patient Portal offered by Health system by registering at the following website: [email or web address]. By joining [facility]’s FollowMyHealth portal, you will also be able to view your health information using other applications (apps) compatible with our system.

## 2020-01-30 NOTE — DISCHARGE NOTE PROVIDER - CARE PROVIDER_API CALL
Stephanie Gutierrez)  Internal Medicine  [address] [facility], [address] [place] [address]  Phone: [phone]  Fax: [phone]  Scheduled Appointment: 02/03/2020 12:15 PM    Ruma Enriquez  [address] [address] [place] [address]  Phone: [phone]  Fax: (   )    -  Established Patient  Scheduled Appointment: 02/10/2020 02:45 PM

## 2020-01-30 NOTE — DISCHARGE NOTE PROVIDER - HOSPITAL COURSE
48 y/o F w/ Mhx of MVP and prior episodes of syncope (last in 2010) intially presented w/ seizure x 1 min, LOC, post ictal confusion lasting about an hour and was brought to the ED for further evaluation. Neurology was consulted and an EEG which showed no seizure activity and MRI Brain which showed scattered non specific hyper intense white matter lesions in which the ddx includes  demyelinating, infectious, or inflammatory etiologies. Findings can also be seen with vasospastic phenomenon such as migraine headaches 50 y/o F w/ Mhx of MVP and prior episodes of syncope (last in 2010) intially presented w/ seizure x 1 min, LOC, post ictal confusion lasting about an hour and was brought to the ED for further evaluation. Neurology was consulted and an EEG which showed no seizure activity and MRI Brain which showed scattered non specific hyper intense white matter lesions in which the ddx includes  demyelinating, infectious, or inflammatory etiologies. Findings can also be seen with vasospastic phenomenon such as migraine headaches. 50 y/o F w/ Mhx of MVP and prior episodes of syncope (last in ) intially presented w/ seizure x 1 min, LOC, post ictal confusion lasting about an hour and was brought to the ED for further evaluation. Neurology was consulted, an EEG was performed and showed no seizure activity, an MRI Brain was performed which showed scattered non specific hyper intense white matter lesions in which the ddx includes  demyelinating, infectious, or inflammatory etiologies, also c/w migraine headaches. Patient has been asymptomatic and has had no seizure activity with her hospital course being unremarkable. However, patient has expressed concern about her  with whom are recently . Patient has placed restraining order against  which has recently  and patient is concerned about safety. Social work was consulted and police became involved for possible renewal of restraining order. Patient is stable for discharge.

## 2020-01-30 NOTE — PROGRESS NOTE ADULT - PROBLEM SELECTOR PLAN 4
Improve score 0  -Encourage ambulation

## 2020-01-30 NOTE — DISCHARGE NOTE PROVIDER - NSDCCPCAREPLAN_GEN_ALL_CORE_FT
PRINCIPAL DISCHARGE DIAGNOSIS  Diagnosis: Seizure  Assessment and Plan of Treatment: You have been seen and evaluated for a seizure in which an EEG was performed to monitor for seizure activiy for which there was none. You have also had an MRI performed which showed non specific changes that could represent a number of things including migraine, infection, or autoimmune proccesses. Please make sure to follow up with your PCP and neurologist regarding this which, both of whom's contact information have been provided.      SECONDARY DISCHARGE DIAGNOSES  Diagnosis: Anemia, unspecified type  Assessment and Plan of Treatment: You have seen and evaluated for iron deficiency anemia likely due to blood loss from your recently reported heavy menstrual bleeding. We have prescribed an iron supplement for this condition along with a bowel regimen to offset an constipation that may be caused by the supplements. Please make sure to follow up with a GI doctor for routine colonoscopy screening to investigate for other sources of bleeding as well as your PCP.

## 2020-01-30 NOTE — DISCHARGE NOTE PROVIDER - NSDCMRMEDTOKEN_GEN_ALL_CORE_FT
aspirin 81 mg oral tablet: 1 tab(s) orally once a week  black seed oil OTC supplement: 5 milliliter(s) orally once a day (in the morning)  cholecalciferol oral tablet: 1 tab(s) orally every 2 weeks  Fish Oil oral capsule: 1 cap(s) orally once a day  OTC supplement  ibuprofen 200 mg oral tablet: 1 tab(s) orally every 6 hours, As Needed for menstrual cramps  Multiple Vitamins oral tablet: 1 tab(s) orally once a day  Tylenol 325 mg oral tablet: 2 tab(s) orally every 6 hours, As Needed for menstrual cramps aspirin 81 mg oral tablet: 1 tab(s) orally once a week  cholecalciferol oral tablet: 1 tab(s) orally every 2 weeks  Colace 100 mg oral capsule: 1 cap(s) orally once a day   FeroSul 325 mg (65 mg elemental iron) oral tablet: 1 tab(s) orally 3 times a day  Fish Oil oral capsule: 1 cap(s) orally once a day  OTC supplement  Multiple Vitamins oral tablet: 1 tab(s) orally once a day  senna 8.8 mg/5 mL oral syrup: 10 milliliter(s) orally once a day (at bedtime)

## 2020-01-30 NOTE — PROGRESS NOTE ADULT - PROBLEM SELECTOR PLAN 2
Current hb 8.7; 13 on last admission  - f/u iron studies  - will contact PCP about poss prior anemia w/u   - out patient GI f/u for colonoscopy screening due to anemia  - outpatient ob gyn to discuss essure removal Current hb 8.7; 13 on last admission. Patient states she has been having heavier menstrual bleeding s/p essure placement  - serum iron low, ferritin low c/w iron deficiency anemia  - will contact PCP about poss prior anemia w/u   - out patient GI f/u for colonoscopy screening due to anemia  - outpatient ob gyn to discuss essure removal

## 2020-01-30 NOTE — PROGRESS NOTE ADULT - SUBJECTIVE AND OBJECTIVE BOX
PROGRESS NOTE:     Patient is a 49y old  Female who presents with a chief complaint of Seizure (2020 10:19)      SUBJECTIVE / OVERNIGHT EVENTS:        MEDICATIONS  (STANDING):  aspirin enteric coated 81 milliGRAM(s) Oral daily  influenza   Vaccine 0.5 milliLiter(s) IntraMuscular once  multivitamin 1 Tablet(s) Oral daily  omega-3-Acid Ethyl Esters 2 Gram(s) Oral two times a day    MEDICATIONS  (PRN):  acetaminophen   Tablet .. 650 milliGRAM(s) Oral every 6 hours PRN Temp greater or equal to 38C (100.4F), Moderate Pain (4 - 6)      CAPILLARY BLOOD GLUCOSE        I&O's Summary      PHYSICAL EXAM:  Vital Signs Last 24 Hrs  T(C): 36.3 (2020 05:20), Max: 36.7 (2020 13:46)  T(F): 97.4 (2020 05:20), Max: 98.1 (2020 13:46)  HR: 70 (2020 05:20) (68 - 86)  BP: 100/52 (2020 05:20) (100/52 - 109/58)  BP(mean): --  RR: 18 (2020 05:20) (17 - 18)  SpO2: 100% (2020 05:20) (100% - 100%)    CONSTITUTIONAL: NAD, well-developed  RESPIRATORY: Normal respiratory effort; lungs are clear to auscultation bilaterally  CARDIOVASCULAR: Regular rate and rhythm, normal S1 and S2, no murmur/rub/gallop; No lower extremity edema; Peripheral pulses are 2+ bilaterally  ABDOMEN: Nontender to palpation, normoactive bowel sounds, no rebound/guarding; No hepatosplenomegaly  MUSCLOSKELETAL: no clubbing or cyanosis of digits; no joint swelling or tenderness to palpation  NEURO: CN 2-12 grossly intact, moves all limbs spontaneously  PSYCH: A+O to person, place, and time; affect appropriate    LABS:                        8.7    6.44  )-----------( 362      ( 2020 06:45 )             29.6     01-29    131<L>  |  98  |  18  ----------------------------<  149<H>  4.0   |  22  |  0.74    Ca    8.9      2020 06:45  Phos  3.6       Mg     1.9                 Urinalysis Basic - ( 2020 13:20 )    Color: LT RED / Appearance: -- / S.008 / pH: 6.0  Gluc: NEGATIVE / Ketone: NEGATIVE  / Bili: NEGATIVE / Urobili: NORMAL   Blood: LARGE / Protein: 20 / Nitrite: NEGATIVE   Leuk Esterase: NEGATIVE / RBC: >50 / WBC x   Sq Epi: x / Non Sq Epi: OCC / Bacteria: x          RADIOLOGY & ADDITIONAL TESTS:  Results Reviewed:   Imaging Personally Reviewed:  Electrocardiogram Personally Reviewed:    COORDINATION OF CARE:  Care Discussed with Consultants/Other Providers [Y/N]:  Prior or Outpatient Records Reviewed [Y/N]: PROGRESS NOTE:     Patient is a 49y old  Female who presents with a chief complaint of Seizure (2020 10:19)      SUBJECTIVE / OVERNIGHT EVENTS: Patient seen and examined at bedside w/ no subjective complaints. NAEO.        MEDICATIONS  (STANDING):  aspirin enteric coated 81 milliGRAM(s) Oral daily  influenza   Vaccine 0.5 milliLiter(s) IntraMuscular once  multivitamin 1 Tablet(s) Oral daily  omega-3-Acid Ethyl Esters 2 Gram(s) Oral two times a day    MEDICATIONS  (PRN):  acetaminophen   Tablet .. 650 milliGRAM(s) Oral every 6 hours PRN Temp greater or equal to 38C (100.4F), Moderate Pain (4 - 6)      CAPILLARY BLOOD GLUCOSE        I&O's Summary      PHYSICAL EXAM:  Vital Signs Last 24 Hrs  T(C): 36.3 (2020 05:20), Max: 36.7 (2020 13:46)  T(F): 97.4 (2020 05:20), Max: 98.1 (2020 13:46)  HR: 70 (2020 05:20) (68 - 86)  BP: 100/52 (2020 05:20) (100/52 - 109/58)  BP(mean): --  RR: 18 (2020 05:20) (17 - 18)  SpO2: 100% (2020 05:20) (100% - 100%)    General appearance: NAD, conversant, afebrile    Eyes: anicteric sclerae, moist conjunctivae; no lid-lag   HENT: Atraumatic; oropharynx clear with moist mucous membranes    Neck: Trachea midline; Full range of motion, supple   Pulm: Lungs clear to auscultation bilaterally, with normal respiratory effort and no intercostal retractions; normal work of breathing   CV: Regular Rhythm and Rate; Normal S1, S2; No murmurs, rubs, or gallops   Abdomen: Soft, non-tender, non-distended   Extremities: No peripheral edema   Psych: Appropriate affect, cooperative; alert and oriented to person, place and time      LABS:                        8.7    6.44  )-----------( 362      ( 2020 06:45 )             29.6     01-29    131<L>  |  98  |  18  ----------------------------<  149<H>  4.0   |  22  |  0.74    Ca    8.9      2020 06:45  Phos  3.6       Mg     1.9                 Urinalysis Basic - ( 2020 13:20 )    Color: LT RED / Appearance: -- / S.008 / pH: 6.0  Gluc: NEGATIVE / Ketone: NEGATIVE  / Bili: NEGATIVE / Urobili: NORMAL   Blood: LARGE / Protein: 20 / Nitrite: NEGATIVE   Leuk Esterase: NEGATIVE / RBC: >50 / WBC x   Sq Epi: x / Non Sq Epi: OCC / Bacteria: x          RADIOLOGY & ADDITIONAL TESTS:  Results Reviewed:   Imaging Personally Reviewed:  Electrocardiogram Personally Reviewed:    COORDINATION OF CARE:  Care Discussed with Consultants/Other Providers [Y/N]:  Prior or Outpatient Records Reviewed [Y/N]:

## 2020-01-30 NOTE — PROGRESS NOTE ADULT - NSHPATTENDINGPLANDISCUSS_GEN_ALL_CORE
Patient who is in agreement with plan described above.

## 2020-01-30 NOTE — PROGRESS NOTE ADULT - PROBLEM SELECTOR PLAN 5
Dispo- seizure w/u as per neuro; likely d/c home w/ outpt neuro follow up    1.  Name of PCP: Dr. Stephanie Gutierrez [phone]  2.  PCP Contacted on Admission: [ ] Y    [ x] N    3.  PCP contacted at Discharge: [ ] Y    [ ] N    [ ] N/A  4.  Post-Discharge Appointment Date and Location:  5.  Summary of Handoff given to PCP: Y    Dr. Rene Brown, Hand Surgeon of [facility]: [phone]  Ob Gyn: [phone] Dispo- seizure w/u as per neuro; likely d/c home w/ outpt neuro follow up.  -SW c/s as patient expresses concerns about  () visiting;     1.  Name of PCP: Dr. Stephanie Gutierrez [phone]  2.  PCP Contacted on Admission: [ ] Y    [ x] N    3.  PCP contacted at Discharge: [ ] Y    [ ] N    [ ] N/A  4.  Post-Discharge Appointment Date and Location:  5.  Summary of Handoff given to PCP: Y    Dr. Rene Brown, Hand Surgeon of [facility] [place]: [phone]  Ob Gyn: [phone] Dispo- seizure w/u as per neuro; likely d/c home w/ outpt neuro follow up.  -SW c/s as patient expresses concerns about  () visiting;     1.  Name of PCP: Dr. Stephanie Gutierrez ([place] office: [phone]; [place] office: [phone])  2.  PCP Contacted on Admission: [ ] Y    [ x] N    3.  PCP contacted at Discharge: [x] Y    [ ] N    [ ] N/A  4.  Post-Discharge Appointment Date and Location: patient to call  5.  Summary of Handoff given to PCP: Yes    Dr. Rene Brown, Hand Surgeon of [facility] [place]: [phone]  Ob Gyn: [phone] Dispo- seizure w/u as per neuro; likely d/c home w/ outpt neuro follow up.  -SW c/s as patient expresses concerns about  () visiting;     1.  Name of PCP: Dr. Stephanie Gutierrez ([place] office: [phone]; [place] office: [phone])  2.  PCP Contacted on Admission: [ ] Y    [ x] N    3.  PCP contacted at Discharge: [x] Y    [ ] N    [ ] N/A  4.  Post-Discharge Appointment Date and Location: [place] Office - Monday 2/3/2020 at 12:15pm.  5.  Summary of Handoff given to PCP: Yes    Dr. Rene Brown, Hand Surgeon of [facility] [place]: [phone]  Ob Gyn: [phone]
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
## 2020-01-31 LAB
FOLATE RBC-MCNC: 1457 — SIGNIFICANT CHANGE UP
HCT VFR BLD CALC: 31.3 % — LOW (ref 34.5–45)

## 2020-04-05 NOTE — ASU PREOP CHECKLIST - DNR CLARIFICATION FORM COMPLETED
Clear bilaterally, pupils equal, round and reactive to light. No photophobia no sclera icterus bilaterally n/a

## 2022-04-10 ENCOUNTER — EMERGENCY (EMERGENCY)
Facility: HOSPITAL | Age: 52
LOS: 1 days | Discharge: HOME | End: 2022-04-10
Attending: EMERGENCY MEDICINE | Admitting: EMERGENCY MEDICINE
Payer: MEDICAID

## 2022-04-10 VITALS
OXYGEN SATURATION: 99 % | HEIGHT: 62 IN | DIASTOLIC BLOOD PRESSURE: 52 MMHG | HEART RATE: 73 BPM | RESPIRATION RATE: 18 BRPM | TEMPERATURE: 98 F | SYSTOLIC BLOOD PRESSURE: 85 MMHG

## 2022-04-10 VITALS
SYSTOLIC BLOOD PRESSURE: 117 MMHG | HEART RATE: 75 BPM | OXYGEN SATURATION: 99 % | RESPIRATION RATE: 18 BRPM | DIASTOLIC BLOOD PRESSURE: 73 MMHG

## 2022-04-10 DIAGNOSIS — Z98.51 TUBAL LIGATION STATUS: Chronic | ICD-10-CM

## 2022-04-10 LAB
ALBUMIN SERPL ELPH-MCNC: 3.8 G/DL — SIGNIFICANT CHANGE UP (ref 3.3–5)
ALP SERPL-CCNC: 88 U/L — SIGNIFICANT CHANGE UP (ref 40–120)
ALT FLD-CCNC: 22 U/L — SIGNIFICANT CHANGE UP (ref 4–33)
ANION GAP SERPL CALC-SCNC: 12 MMOL/L — SIGNIFICANT CHANGE UP (ref 7–14)
APPEARANCE UR: ABNORMAL
APTT BLD: 31.9 SEC — SIGNIFICANT CHANGE UP (ref 27–36.3)
AST SERPL-CCNC: 18 U/L — SIGNIFICANT CHANGE UP (ref 4–32)
B PERT DNA SPEC QL NAA+PROBE: SIGNIFICANT CHANGE UP
B PERT+PARAPERT DNA PNL SPEC NAA+PROBE: SIGNIFICANT CHANGE UP
BASOPHILS # BLD AUTO: 0.04 K/UL — SIGNIFICANT CHANGE UP (ref 0–0.2)
BASOPHILS NFR BLD AUTO: 0.6 % — SIGNIFICANT CHANGE UP (ref 0–2)
BILIRUB SERPL-MCNC: 0.6 MG/DL — SIGNIFICANT CHANGE UP (ref 0.2–1.2)
BILIRUB UR-MCNC: NEGATIVE — SIGNIFICANT CHANGE UP
BLOOD GAS VENOUS COMPREHENSIVE RESULT: SIGNIFICANT CHANGE UP
BORDETELLA PARAPERTUSSIS (RAPRVP): SIGNIFICANT CHANGE UP
BUN SERPL-MCNC: 6 MG/DL — LOW (ref 7–23)
C PNEUM DNA SPEC QL NAA+PROBE: SIGNIFICANT CHANGE UP
CALCIUM SERPL-MCNC: 8.8 MG/DL — SIGNIFICANT CHANGE UP (ref 8.4–10.5)
CHLORIDE SERPL-SCNC: 104 MMOL/L — SIGNIFICANT CHANGE UP (ref 98–107)
CO2 SERPL-SCNC: 21 MMOL/L — LOW (ref 22–31)
COLOR SPEC: SIGNIFICANT CHANGE UP
CREAT SERPL-MCNC: 0.62 MG/DL — SIGNIFICANT CHANGE UP (ref 0.5–1.3)
DIFF PNL FLD: NEGATIVE — SIGNIFICANT CHANGE UP
EGFR: 108 ML/MIN/1.73M2 — SIGNIFICANT CHANGE UP
EOSINOPHIL # BLD AUTO: 0.02 K/UL — SIGNIFICANT CHANGE UP (ref 0–0.5)
EOSINOPHIL NFR BLD AUTO: 0.3 % — SIGNIFICANT CHANGE UP (ref 0–6)
FLUAV SUBTYP SPEC NAA+PROBE: SIGNIFICANT CHANGE UP
FLUBV RNA SPEC QL NAA+PROBE: SIGNIFICANT CHANGE UP
GLUCOSE SERPL-MCNC: 167 MG/DL — HIGH (ref 70–99)
GLUCOSE UR QL: NEGATIVE — SIGNIFICANT CHANGE UP
HADV DNA SPEC QL NAA+PROBE: SIGNIFICANT CHANGE UP
HCOV 229E RNA SPEC QL NAA+PROBE: SIGNIFICANT CHANGE UP
HCOV HKU1 RNA SPEC QL NAA+PROBE: SIGNIFICANT CHANGE UP
HCOV NL63 RNA SPEC QL NAA+PROBE: SIGNIFICANT CHANGE UP
HCOV OC43 RNA SPEC QL NAA+PROBE: SIGNIFICANT CHANGE UP
HCT VFR BLD CALC: 33.2 % — LOW (ref 34.5–45)
HGB BLD-MCNC: 11.2 G/DL — LOW (ref 11.5–15.5)
HMPV RNA SPEC QL NAA+PROBE: SIGNIFICANT CHANGE UP
HPIV1 RNA SPEC QL NAA+PROBE: SIGNIFICANT CHANGE UP
HPIV2 RNA SPEC QL NAA+PROBE: SIGNIFICANT CHANGE UP
HPIV3 RNA SPEC QL NAA+PROBE: SIGNIFICANT CHANGE UP
HPIV4 RNA SPEC QL NAA+PROBE: SIGNIFICANT CHANGE UP
IANC: 5.53 K/UL — SIGNIFICANT CHANGE UP (ref 1.8–7.4)
IMM GRANULOCYTES NFR BLD AUTO: 0.3 % — SIGNIFICANT CHANGE UP (ref 0–1.5)
INR BLD: 1.14 RATIO — SIGNIFICANT CHANGE UP (ref 0.88–1.16)
KETONES UR-MCNC: NEGATIVE — SIGNIFICANT CHANGE UP
LEUKOCYTE ESTERASE UR-ACNC: NEGATIVE — SIGNIFICANT CHANGE UP
LYMPHOCYTES # BLD AUTO: 0.91 K/UL — LOW (ref 1–3.3)
LYMPHOCYTES # BLD AUTO: 13.6 % — SIGNIFICANT CHANGE UP (ref 13–44)
M PNEUMO DNA SPEC QL NAA+PROBE: SIGNIFICANT CHANGE UP
MCHC RBC-ENTMCNC: 27.7 PG — SIGNIFICANT CHANGE UP (ref 27–34)
MCHC RBC-ENTMCNC: 33.7 GM/DL — SIGNIFICANT CHANGE UP (ref 32–36)
MCV RBC AUTO: 82 FL — SIGNIFICANT CHANGE UP (ref 80–100)
MONOCYTES # BLD AUTO: 0.19 K/UL — SIGNIFICANT CHANGE UP (ref 0–0.9)
MONOCYTES NFR BLD AUTO: 2.8 % — SIGNIFICANT CHANGE UP (ref 2–14)
NEUTROPHILS # BLD AUTO: 5.53 K/UL — SIGNIFICANT CHANGE UP (ref 1.8–7.4)
NEUTROPHILS NFR BLD AUTO: 82.4 % — HIGH (ref 43–77)
NITRITE UR-MCNC: NEGATIVE — SIGNIFICANT CHANGE UP
NRBC # BLD: 0 /100 WBCS — SIGNIFICANT CHANGE UP
NRBC # FLD: 0 K/UL — SIGNIFICANT CHANGE UP
PH UR: 6.5 — SIGNIFICANT CHANGE UP (ref 5–8)
PLATELET # BLD AUTO: 276 K/UL — SIGNIFICANT CHANGE UP (ref 150–400)
POTASSIUM SERPL-MCNC: 3.9 MMOL/L — SIGNIFICANT CHANGE UP (ref 3.5–5.3)
POTASSIUM SERPL-SCNC: 3.9 MMOL/L — SIGNIFICANT CHANGE UP (ref 3.5–5.3)
PROT SERPL-MCNC: 6.2 G/DL — SIGNIFICANT CHANGE UP (ref 6–8.3)
PROT UR-MCNC: NEGATIVE — SIGNIFICANT CHANGE UP
PROTHROM AB SERPL-ACNC: 13.3 SEC — SIGNIFICANT CHANGE UP (ref 10.5–13.4)
RAPID RVP RESULT: SIGNIFICANT CHANGE UP
RBC # BLD: 4.05 M/UL — SIGNIFICANT CHANGE UP (ref 3.8–5.2)
RBC # FLD: 14.6 % — HIGH (ref 10.3–14.5)
RSV RNA SPEC QL NAA+PROBE: SIGNIFICANT CHANGE UP
RV+EV RNA SPEC QL NAA+PROBE: SIGNIFICANT CHANGE UP
SARS-COV-2 RNA SPEC QL NAA+PROBE: SIGNIFICANT CHANGE UP
SODIUM SERPL-SCNC: 137 MMOL/L — SIGNIFICANT CHANGE UP (ref 135–145)
SP GR SPEC: 1.01 — SIGNIFICANT CHANGE UP (ref 1–1.05)
UROBILINOGEN FLD QL: SIGNIFICANT CHANGE UP
WBC # BLD: 6.71 K/UL — SIGNIFICANT CHANGE UP (ref 3.8–10.5)
WBC # FLD AUTO: 6.71 K/UL — SIGNIFICANT CHANGE UP (ref 3.8–10.5)

## 2022-04-10 PROCEDURE — 93010 ELECTROCARDIOGRAM REPORT: CPT

## 2022-04-10 PROCEDURE — 99284 EMERGENCY DEPT VISIT MOD MDM: CPT | Mod: 25

## 2022-04-10 PROCEDURE — G1004: CPT

## 2022-04-10 PROCEDURE — 70450 CT HEAD/BRAIN W/O DYE: CPT | Mod: 26,MG

## 2022-04-10 PROCEDURE — 99291 CRITICAL CARE FIRST HOUR: CPT | Mod: 25

## 2022-04-10 PROCEDURE — 71045 X-RAY EXAM CHEST 1 VIEW: CPT | Mod: 26

## 2022-04-10 RX ORDER — SODIUM CHLORIDE 9 MG/ML
2000 INJECTION INTRAMUSCULAR; INTRAVENOUS; SUBCUTANEOUS ONCE
Refills: 0 | Status: COMPLETED | OUTPATIENT
Start: 2022-04-10 | End: 2022-04-10

## 2022-04-10 RX ORDER — SODIUM CHLORIDE 9 MG/ML
1000 INJECTION INTRAMUSCULAR; INTRAVENOUS; SUBCUTANEOUS ONCE
Refills: 0 | Status: COMPLETED | OUTPATIENT
Start: 2022-04-10 | End: 2022-04-10

## 2022-04-10 RX ORDER — MIDODRINE HYDROCHLORIDE 2.5 MG/1
20 TABLET ORAL ONCE
Refills: 0 | Status: COMPLETED | OUTPATIENT
Start: 2022-04-10 | End: 2022-04-10

## 2022-04-10 RX ADMIN — SODIUM CHLORIDE 1000 MILLILITER(S): 9 INJECTION INTRAMUSCULAR; INTRAVENOUS; SUBCUTANEOUS at 11:27

## 2022-04-10 RX ADMIN — SODIUM CHLORIDE 1000 MILLILITER(S): 9 INJECTION INTRAMUSCULAR; INTRAVENOUS; SUBCUTANEOUS at 09:36

## 2022-04-10 NOTE — ED ADULT TRIAGE NOTE - CHIEF COMPLAINT QUOTE
Patient brought to ER by EMS from home for hypotension. Pt has hx of hypotension and was feeling weak and chest discomfort. 5/10. Pt has a 20 gauge IV lock with NS infusing.  . Baseline 90/60 BP normally.

## 2022-04-10 NOTE — ED PROVIDER NOTE - PROGRESS NOTE DETAILS
[place], PGY3: Patient re-evaluated at bedside and is now currently feeling better after treatment. blood pressure within normal limits without any other abnormal vitals. pt was able to walk without assistance. VSS. Time was taken to answer all of patients questions and concerns. Return precaution instructions were given and patient understands and feels comfortable with disposition home and f/u w/ pmd

## 2022-04-10 NOTE — ED PROVIDER NOTE - NS ED ROS FT
GENERAL: No fever, +chills  EYES: no change in vision  HEENT: no trouble swallowing, no trouble speaking  CARDIAC: no chest pain, no palpitations  PULMONARY: no cough, no SOB  GI: no abdominal pain, no nausea, no vomiting, no diarrhea, no constipation  : no dysuria, +frequency, no change in appearance, no odor of urine  SKIN: no rashes  NEURO: no headache, +weakness  MSK: no joint pain

## 2022-04-10 NOTE — ED PROVIDER NOTE - CLINICAL SUMMARY MEDICAL DECISION MAKING FREE TEXT BOX
50y/o F w/ h/o HLD, MVP p/w weakness 2d a/w chills, urinary frequency. Pt hypotensive could be sepsis. Will check wbc, lactate, cultures, and imaging. give IV fluids and reassess. 52y/o F w/ h/o HLD, T2DM, MVP p/w weakness 2d a/w chills, urinary frequency. Pt hypotensive could be sepsis possible UTI. Not consistent with endocarditis with no murmur and pt taking abx post procedure. Will check wbc, lactate, cultures, CXR, UA, give IV fluids and reassess.

## 2022-04-10 NOTE — ED PROVIDER NOTE - PATIENT PORTAL LINK FT
You can access the FollowMyHealth Patient Portal offered by [facility] by registering at the following website: [email or web address]. By joining [facility] [employer]’s FollowMyHealth portal, you will also be able to view your health information using other applications (apps) compatible with our system.

## 2022-04-10 NOTE — ED ADULT NURSE NOTE - OBJECTIVE STATEMENT
p/t is a 51y old female received awake and responsive, c/o of generalized weakness and chest discomfort, labs drawn and sent as per MD order, no neuro deficits noted

## 2022-04-10 NOTE — ED PROVIDER NOTE - OBJECTIVE STATEMENT
52y/o F w/ h/o HLD, MVP p/w weakness. Pt states she has been feeling weak for the past 2d a/w chills. Has tooth extraction 9d, completed abx course. Has been urinating more frequently. Denies fevers, nausea, vomiting, chest pain, cough, sob, abdominal pain, back pain, dysuria, numbness, tingling.

## 2022-04-10 NOTE — ED PROVIDER NOTE - ATTENDING CONTRIBUTION TO CARE
51 year old with weakness, found to be hypotensive, slightly below baseline. infection workup vs medication misadventure vs anemia vs renal failure vs electrolyte abn vs acs vs vs viral syndrome. patient without specific complaint.s labs lactate trop tsh cxr head ct midodrine reassess

## 2022-04-10 NOTE — ED PROVIDER NOTE - NSFOLLOWUPINSTRUCTIONS_ED_ALL_ED_FT
Dizziness    Dizziness can manifest as a feeling of unsteadiness or light-headedness. You may feel like you are about to faint. This condition can be caused by a number of things, including medicines, dehydration, or illness. Drink enough fluid to keep your urine clear or pale yellow. Do not drink alcohol and limit your caffeine intake. Avoid quick or sudden movements.  Rise slowly from chairs and steady yourself until you feel okay. In the morning, first sit up on the side of the bed.    SEEK IMMEDIATE MEDICAL CARE IF YOU HAVE ANY OF THE FOLLOWING SYMPTOMS: vomiting, changes in your vision or speech, weakness in your arms or legs, trouble speaking or swallowing, chest pain, abdominal pain, shortness of breath, sweating, bleeding, headache, neck pain, or fever.     -You were give MIDODRINE in the ED which can help low blood pressure    -Your blood pressure was normal throughout your ED stay, please follow up with your Primary Care Doctor within 24-48 hours and bring your paperwork

## 2022-04-11 LAB
CULTURE RESULTS: NO GROWTH — SIGNIFICANT CHANGE UP
SPECIMEN SOURCE: SIGNIFICANT CHANGE UP

## 2022-04-15 LAB
CULTURE RESULTS: SIGNIFICANT CHANGE UP
CULTURE RESULTS: SIGNIFICANT CHANGE UP
SPECIMEN SOURCE: SIGNIFICANT CHANGE UP
SPECIMEN SOURCE: SIGNIFICANT CHANGE UP

## 2022-10-28 NOTE — H&P ADULT - PROBLEM/PLAN-3
Patient Specific Counseling (Will Not Stick From Patient To Patient): it is a reported side effect from taltz to get eczematous dermatitis Detail Level: Detailed DISPLAY PLAN FREE TEXT Passed

## 2023-04-06 NOTE — H&P PST ADULT - ATTENDING COMMENTS
CHIEF COMPLAINT:  Chief Complaint   Patient presents with   • Well Child       HISTORY OF PRESENT ILLNESS:  Violet is a 11 year old female who presents for a well-child-exam.  The patient is brought in by her mother today.      Issues raised.  No other concerns were raised today.  The patient has been well recently.  No fever, runny nose, cough, vomiting, diarrhea, eye drainage, redness or rash.    Mother is questioning dyslexia.  She is currently home schooled. Mother states that she has always struggled with spelling.  Executive functioning she struggles with and not sure if this is related to adhd. She is unable to show processes with math. Mother has seen things flipped such as letters and numbers. She will have a hard time spelling out and sounding out.     Nutrition/ activity.   • She is  Picky at times.   • She eats vegetables and fruits.   • She drinks water. Juice not often. Occasional soda on the weekends.   • She snacks with healthy options.   Dairy intake is good. She doesn't drink milk.  She is ok with cheese or yogurt.     Activity  • She does do warrior jungle every week. Family walks.     Elimination.   • Urinating fine.  • Stooling fine.  No trouble with constipation.   • Dry through night.     Sleep.   • Sleeping well at night   • Difficulty falling asleep   • Does not snore.   • Bedtime on school night:  930-1030pm  • Wakes for school:  7-8am    School:  Grade:  4th  School:  homeschooled  Concerns:  Concerns about dyslexia.     There are no problems to display for this patient.      Outpatient Medications Marked as Taking for the 4/6/23 encounter (Office Visit) with NICOLAS Marcum   Medication Sig Dispense Refill   • Magnesium 200 MG Chew Tab      • VITAMIN D PO      • Pediatric Multivit-Minerals-C (Smarty Pants Kids Complete) Chew Tab Chew 1 tablet by mouth daily. 90 tablet 3       ALLERGIES:  No Known Allergies    SOCIAL:  Social History     Social History Narrative   • Not on file        Primary caretakers: mom and dad  Siblings: none  Smoking: none  Pets: bird and two lizards.       REVIEW OF SYSTEMS:  General:  No fever/chills, recent rapid weight changes.  Dermatologic:  No rashes.  Neurologic:  No dizziness, no headaches (HAs).  Respiratory:  No wheeze, shortness of breath (SOB).  Cardiac:  No chest pain, palpitations, skipped beats.  No history of syncope or dizziness.  Gastrointestinal:  No emesis, diarrhea, constipation.  Musculoskeletal:  No joint swelling/warmth, no recent injuries.  Psychiatric:  No depressive symptoms.    PHYSICAL EXAMINATION:  Visit Vitals  BP 94/66 (BP Location: LUE - Left upper extremity, Patient Position: Sitting, Cuff Size: Small Adult)   Pulse 98   Resp 22   Ht 4' 7.71\" (1.415 m)   Wt 37.3 kg (82 lb 2 oz)   BMI 18.60 kg/m²    Body mass index is 18.6 kg/m².   General:  Well appearing female, no acute distress.  HEENT:  Pupils equal, round, reactive to light (PERRL), extraocular movements intact (EOMI), no conjunctival injection.  Tympanic membrane (TM) with normal landmarks bilaterally.  Oropharynx with moist mucous membranes, clear.  Neck is supple, no lymphadenopathy.  Lungs:  Clear to auscultation bilaterally.  No wheezes, rales, rhonchi.  Normal work of breathing.  Cardiac:  Regular rate and rhythm, normal S1/S2, no murmur appreciated.  Abdomen:  Soft, nontender, nondistended.  Normoactive bowel sounds.  No organomegaly or masses.  Genitourinary:  Deferred exam.  Back:  Spine is straight.  Skin:  No rashes or lesions.  Neurologic:  Normal strength in upper and lower extremities as well as intact.  Normal gait.  Normal walking.  Normal heel walking. Normal heel-to-toe.  Normal crouching and duck-walking.  2+ deep tendon reflexes patellar and brachioradialis.   Cranial nerves II through XII grossly intact.    ASSESSMENT/PLAN:   1.Encounter for routine child health examination without abnormal findings  --return in one year.    2.Dyslexia  -     SERVICE TO  NEUROPSYCHOLOGY  --recommended referral. Can look into tutors through the school systems to see if they can find a teacher specialized with dealing with this.    3.Attention deficit hyperactivity disorder (ADHD), combined type  --looking for more natural alternative options. Suggested omega 3 fatty acids.  --brain blossom program.    4.Anxiety  --discussed therapy through local functional medicine provider that offers a more alternative approach to deal with anxiety and adhd.      Violet is a 11 year old female here for a well child check.  1. Growth:  The patient shows appropriate parameters for both height and weight.  The patient's Body mass index is 18.6 kg/m²..  This places the patient at the 72 %ile (Z= 0.58) based on CDC (Girls, 2-20 Years) BMI-for-age based on BMI available as of 4/6/2023..  We discussed appropriate diet and regular physical activity.  2. Immunizations: declined today  3. Anticipatory guidance discussed.  4. We'll plan on seeing back in 1 year for a well-child check or sooner as needed should other issues arise.    NICOLAS Marcum  [facility]  [address] [address] [phone]    My collaborating providers are Dr. Bland and Dr. Polk.         Torn medial meniscus left knee

## 2023-08-11 ENCOUNTER — EMERGENCY (EMERGENCY)
Facility: HOSPITAL | Age: 53
LOS: 1 days | Discharge: HOME | End: 2023-08-11
Attending: STUDENT IN AN ORGANIZED HEALTH CARE EDUCATION/TRAINING PROGRAM | Admitting: STUDENT IN AN ORGANIZED HEALTH CARE EDUCATION/TRAINING PROGRAM
Payer: MEDICAID

## 2023-08-11 VITALS
DIASTOLIC BLOOD PRESSURE: 93 MMHG | HEIGHT: 62 IN | WEIGHT: 149.91 LBS | SYSTOLIC BLOOD PRESSURE: 136 MMHG | RESPIRATION RATE: 17 BRPM | OXYGEN SATURATION: 97 % | HEART RATE: 81 BPM | TEMPERATURE: 99 F

## 2023-08-11 DIAGNOSIS — Z98.51 TUBAL LIGATION STATUS: Chronic | ICD-10-CM

## 2023-08-11 PROCEDURE — 99285 EMERGENCY DEPT VISIT HI MDM: CPT

## 2023-08-11 RX ORDER — FAMOTIDINE 10 MG/ML
20 INJECTION INTRAVENOUS ONCE
Refills: 0 | Status: COMPLETED | OUTPATIENT
Start: 2023-08-11 | End: 2023-08-11

## 2023-08-11 RX ORDER — ACETAMINOPHEN 500 MG
975 TABLET ORAL ONCE
Refills: 0 | Status: COMPLETED | OUTPATIENT
Start: 2023-08-11 | End: 2023-08-11

## 2023-08-11 RX ORDER — SODIUM CHLORIDE 9 MG/ML
1000 INJECTION INTRAMUSCULAR; INTRAVENOUS; SUBCUTANEOUS ONCE
Refills: 0 | Status: COMPLETED | OUTPATIENT
Start: 2023-08-11 | End: 2023-08-11

## 2023-08-11 RX ADMIN — Medication 975 MILLIGRAM(S): at 23:42

## 2023-08-11 RX ADMIN — FAMOTIDINE 20 MILLIGRAM(S): 10 INJECTION INTRAVENOUS at 23:46

## 2023-08-11 RX ADMIN — Medication 30 MILLILITER(S): at 23:46

## 2023-08-11 RX ADMIN — SODIUM CHLORIDE 1000 MILLILITER(S): 9 INJECTION INTRAMUSCULAR; INTRAVENOUS; SUBCUTANEOUS at 23:46

## 2023-08-11 NOTE — ED PROVIDER NOTE - PATIENT PORTAL LINK FT
You can access the FollowMyHealth Patient Portal offered by [facility] by registering at the following website: [email or web address]. By joining [employer]’s FollowMyHealth portal, you will also be able to view your health information using other applications (apps) compatible with our system.

## 2023-08-11 NOTE — ED ADULT TRIAGE NOTE - HEART RATE (BEATS/MIN)
81 increased time in double stance/decreased velocity of limb motion/decreased swing-to-stance ratio/decreased step length/decreased stride length

## 2023-08-11 NOTE — ED PROVIDER NOTE - PROGRESS NOTE DETAILS
MD Hampton: CT concerning for nonspecific ileus/enteritis likely viral i/s/o COVID+; labs/UA unactionable. Pt was re-evaluated at bedside, VSS, feeling better overall, tolerating PO. Results were discussed with patient as well as return precautions and follow up plan with PCP. Time was taken to answer any questions that the patient had before providing them with discharge paperwork.

## 2023-08-11 NOTE — ED PROVIDER NOTE - CLINICAL SUMMARY MEDICAL DECISION MAKING FREE TEXT BOX
52-year-old female, past medical history of hyperlipidemia and diabetes, presents to ED complaining of left lower quadrant pain since yesterday.  Patient also endorses intermittent subjective fevers, body aches, chills since Monday; recently with positive home COVID test.  Patient was seen by PCP earlier today, who sent to ED for CT scan for evaluation of abdominal pain.  Patient denies chest pain, shortness of breath, nausea/vomiting/diarrhea, constipation, urinary symptoms, lower extremity edema, weakness/numbness, lightheadedness/dizziness or any other symptoms at this time.    Vital signs stable.  Exam significant for left lower quadrant tenderness.  No CVAT.  Lungs clear to auscultation bilaterally.  Heart is regular rate and rhythm.  No lower extremity edema.  Neuro exam is nonfocal.    Differential diagnosis includes but not limited to diverticulitis versus viral gastritis versus UTI.    Plan to check basic labs/electrolytes, VBG, lipase, UA, and CT abdomen pelvis.  Will give IV fluids, Tylenol, Maalox, Pepcid for relief.  Dispo pending results and reassessment.

## 2023-08-11 NOTE — ED ADULT TRIAGE NOTE - CHIEF COMPLAINT QUOTE
Pt arrives to ED c/o fever, HA, chills and sweats since Monday.  In addition, pt reports new abd pain to LLQ starting yesterday.  Pt had home positive covid test today.  Pt denies N/V/D, LBM this morning and normal.  Hx DM, HLD.  [id number]

## 2023-08-12 LAB
ALBUMIN SERPL ELPH-MCNC: 4.2 G/DL — SIGNIFICANT CHANGE UP (ref 3.3–5)
ALP SERPL-CCNC: 113 U/L — SIGNIFICANT CHANGE UP (ref 40–120)
ALT FLD-CCNC: <5 U/L — SIGNIFICANT CHANGE UP (ref 4–33)
ANION GAP SERPL CALC-SCNC: 15 MMOL/L — HIGH (ref 7–14)
APPEARANCE UR: CLEAR — SIGNIFICANT CHANGE UP
AST SERPL-CCNC: <5 U/L — SIGNIFICANT CHANGE UP (ref 4–32)
BASE EXCESS BLDV CALC-SCNC: 3.1 MMOL/L — HIGH (ref -2–3)
BASOPHILS # BLD AUTO: 0.05 K/UL — SIGNIFICANT CHANGE UP (ref 0–0.2)
BASOPHILS NFR BLD AUTO: 0.6 % — SIGNIFICANT CHANGE UP (ref 0–2)
BILIRUB SERPL-MCNC: <0.2 MG/DL — SIGNIFICANT CHANGE UP (ref 0.2–1.2)
BILIRUB UR-MCNC: NEGATIVE — SIGNIFICANT CHANGE UP
BLOOD GAS VENOUS COMPREHENSIVE RESULT: SIGNIFICANT CHANGE UP
BUN SERPL-MCNC: 14 MG/DL — SIGNIFICANT CHANGE UP (ref 7–23)
CALCIUM SERPL-MCNC: 9.1 MG/DL — SIGNIFICANT CHANGE UP (ref 8.4–10.5)
CHLORIDE BLDV-SCNC: 101 MMOL/L — SIGNIFICANT CHANGE UP (ref 96–108)
CHLORIDE SERPL-SCNC: 100 MMOL/L — SIGNIFICANT CHANGE UP (ref 98–107)
CO2 BLDV-SCNC: 33.5 MMOL/L — HIGH (ref 22–26)
CO2 SERPL-SCNC: 23 MMOL/L — SIGNIFICANT CHANGE UP (ref 22–31)
COLOR SPEC: YELLOW — SIGNIFICANT CHANGE UP
CREAT SERPL-MCNC: 0.47 MG/DL — LOW (ref 0.5–1.3)
DIFF PNL FLD: NEGATIVE — SIGNIFICANT CHANGE UP
EGFR: 115 ML/MIN/1.73M2 — SIGNIFICANT CHANGE UP
EOSINOPHIL # BLD AUTO: 0.43 K/UL — SIGNIFICANT CHANGE UP (ref 0–0.5)
EOSINOPHIL NFR BLD AUTO: 5.6 % — SIGNIFICANT CHANGE UP (ref 0–6)
GAS PNL BLDV: 137 MMOL/L — SIGNIFICANT CHANGE UP (ref 136–145)
GLUCOSE BLDV-MCNC: 204 MG/DL — HIGH (ref 70–99)
GLUCOSE SERPL-MCNC: 199 MG/DL — HIGH (ref 70–99)
GLUCOSE UR QL: 100 MG/DL
HCG SERPL-ACNC: 1.6 MIU/ML — SIGNIFICANT CHANGE UP
HCO3 BLDV-SCNC: 32 MMOL/L — HIGH (ref 22–29)
HCT VFR BLD CALC: 42.2 % — SIGNIFICANT CHANGE UP (ref 34.5–45)
HCT VFR BLDA CALC: 43 % — SIGNIFICANT CHANGE UP (ref 34.5–46.5)
HGB BLD CALC-MCNC: 14.4 G/DL — SIGNIFICANT CHANGE UP (ref 11.7–16.1)
HGB BLD-MCNC: 14.5 G/DL — SIGNIFICANT CHANGE UP (ref 11.5–15.5)
IANC: 3.78 K/UL — SIGNIFICANT CHANGE UP (ref 1.8–7.4)
IMM GRANULOCYTES NFR BLD AUTO: 0.3 % — SIGNIFICANT CHANGE UP (ref 0–0.9)
KETONES UR-MCNC: ABNORMAL MG/DL
LACTATE BLDV-MCNC: 1.6 MMOL/L — SIGNIFICANT CHANGE UP (ref 0.5–2)
LEUKOCYTE ESTERASE UR-ACNC: NEGATIVE — SIGNIFICANT CHANGE UP
LIDOCAIN IGE QN: 28 U/L — SIGNIFICANT CHANGE UP (ref 7–60)
LYMPHOCYTES # BLD AUTO: 3.06 K/UL — SIGNIFICANT CHANGE UP (ref 1–3.3)
LYMPHOCYTES # BLD AUTO: 39.5 % — SIGNIFICANT CHANGE UP (ref 13–44)
MCHC RBC-ENTMCNC: 30.5 PG — SIGNIFICANT CHANGE UP (ref 27–34)
MCHC RBC-ENTMCNC: 34.4 GM/DL — SIGNIFICANT CHANGE UP (ref 32–36)
MCV RBC AUTO: 88.8 FL — SIGNIFICANT CHANGE UP (ref 80–100)
MONOCYTES # BLD AUTO: 0.4 K/UL — SIGNIFICANT CHANGE UP (ref 0–0.9)
MONOCYTES NFR BLD AUTO: 5.2 % — SIGNIFICANT CHANGE UP (ref 2–14)
NEUTROPHILS # BLD AUTO: 3.78 K/UL — SIGNIFICANT CHANGE UP (ref 1.8–7.4)
NEUTROPHILS NFR BLD AUTO: 48.8 % — SIGNIFICANT CHANGE UP (ref 43–77)
NITRITE UR-MCNC: NEGATIVE — SIGNIFICANT CHANGE UP
NRBC # BLD: 0 /100 WBCS — SIGNIFICANT CHANGE UP (ref 0–0)
NRBC # FLD: 0 K/UL — SIGNIFICANT CHANGE UP (ref 0–0)
PCO2 BLDV: 64 MMHG — HIGH (ref 39–52)
PH BLDV: 7.3 — LOW (ref 7.32–7.43)
PH UR: 8 — SIGNIFICANT CHANGE UP (ref 5–8)
PLATELET # BLD AUTO: 336 K/UL — SIGNIFICANT CHANGE UP (ref 150–400)
PO2 BLDV: 25 MMHG — SIGNIFICANT CHANGE UP (ref 25–45)
POTASSIUM BLDV-SCNC: 4.2 MMOL/L — SIGNIFICANT CHANGE UP (ref 3.5–5.1)
POTASSIUM SERPL-MCNC: 4.4 MMOL/L — SIGNIFICANT CHANGE UP (ref 3.5–5.3)
POTASSIUM SERPL-SCNC: 4.4 MMOL/L — SIGNIFICANT CHANGE UP (ref 3.5–5.3)
PROT SERPL-MCNC: 7.6 G/DL — SIGNIFICANT CHANGE UP (ref 6–8.3)
PROT UR-MCNC: SIGNIFICANT CHANGE UP MG/DL
RBC # BLD: 4.75 M/UL — SIGNIFICANT CHANGE UP (ref 3.8–5.2)
RBC # FLD: 12.3 % — SIGNIFICANT CHANGE UP (ref 10.3–14.5)
SAO2 % BLDV: 33.6 % — LOW (ref 67–88)
SODIUM SERPL-SCNC: 138 MMOL/L — SIGNIFICANT CHANGE UP (ref 135–145)
SP GR SPEC: 1.03 — SIGNIFICANT CHANGE UP (ref 1–1.03)
UROBILINOGEN FLD QL: 1 MG/DL — SIGNIFICANT CHANGE UP (ref 0.2–1)
WBC # BLD: 7.74 K/UL — SIGNIFICANT CHANGE UP (ref 3.8–10.5)
WBC # FLD AUTO: 7.74 K/UL — SIGNIFICANT CHANGE UP (ref 3.8–10.5)

## 2023-08-12 PROCEDURE — 74177 CT ABD & PELVIS W/CONTRAST: CPT | Mod: 26,MA

## 2023-08-12 NOTE — ED ADULT NURSE NOTE - CHIEF COMPLAINT QUOTE
Pt arrives to ED c/o fever, HA, chills and sweats since Monday.  In addition, pt reports new abd pain to LLQ starting yesterday.  Pt had home positive covid test today.  Pt denies N/V/D, LBM this morning and normal.  Hx DM, HLD.  xm=[id number]

## 2023-08-12 NOTE — ED ADULT NURSE NOTE - OBJECTIVE STATEMENT
Received pt in room in5. A&Ox4, ambulatory at baseline, c/o LLQ abd pain since yesterday. LLQ tender to palpation, LBM yesterday. endorses intermittent subjective fevers, body aches, chills since Monday; recently with positive home COVID test.  Patient was seen by PCP earlier today, who sent to ED for CT scan for evaluation of abdominal pain, Denies CP, SOB, N+V, diarrhea, headache, dizziness, exposure to sick, bowel/bladder changes. VSS. RR even and unlabored. 20g placed in --- AC. Labs sent. Medication given. Awaiting further orders from provider.

## 2023-08-12 NOTE — ED ADULT NURSE NOTE - NSFALLUNIVINTERV_ED_ALL_ED
Bed/Stretcher in lowest position, wheels locked, appropriate side rails in place/Call bell, personal items and telephone in reach/Instruct patient to call for assistance before getting out of bed/chair/stretcher/Non-slip footwear applied when patient is off stretcher/[place] to call system/Physically safe environment - no spills, clutter or unnecessary equipment/Purposeful proactive rounding/Room/bathroom lighting operational, light cord in reach

## 2023-08-16 NOTE — ED PROVIDER NOTE - EKG ADDITIONAL QUESTION - PERFORMED INDEPENDENT VISUALIZATION
"Hospital Medicine Daily Progress Note    Date of Service  8/16/2023    Chief Complaint  Kobe Spear is a 66 y.o. male admitted 8/3/2023 with abdominal pain.    Hospital Course  History of CVA, CKD stage III, DM2, GERD who is traveling through [place] and developed sudden onset severe abdominal pain and presented to the ER where he was found to have markedly elevated LFTs and lipase highly suggestive of gallstone pancreatitis.  Right upper quadrant ultrasound revealed cholelithiasis however no CBD dilation.  General surgery was consulted and recommended MRCP.  He was started on empiric IV antibiotic therapy due to marked leukocytosis .  MRCP showed no evidence of choledocholithiasis only cholelithiasis.  And confirmed acute pancreatitis.  Patient became markedly agitated and was upgraded to the ICU so Precedex could be used.  He was in the ICU 8/5 through 8/10.  He also had respiratory failure secondary to the agitation and required BiPAP high flow nasal cannula.  He has been weaned from this and is now down to 4 L nasal cannula and saturating well.  He remains very anxious and constantly moving and thus wife is at bedside.    Interval Problem Update  Per notes, \"8/11 patient anxious and required restraints overnight.  Seen with wife at bedside.  Patient has no abdominal pain and is tolerating NG tube feeds well.  He is very anxious and tachypneic at time of examination however with repositioning his tachypnea resolved.  He told me he wanted to try and eat cheese today.  He is not having any neurologic changes and the right upper extremity weakness is the same per wife at bedside.  Leukocytosis persists and patient is having elevated temperature.  He had blood cultures done on 8/6 which came back negative.  I will get a chest x-ray as well as repeat blood cultures at this time.  Addendum:  patient with decompensation and higher temp, hr and RR this afternoon.  CXR stable, concern he is again septic.  Blood " cultures, lactic acid, and UA pending.  Discussed with Dr Campa, low threshold to place back in the ICU.  I've added zyvox for broader coverage and will continue with zosyn.    8/12 -patient appears similar to how he did yesterday afternoon.  He had breaking of the fever yesterday but had another fever again today up to 102.  Chest x-ray was done which showed stable changes and actually overall improvement from earlier in hospital stay.  Sodium is elevated at 150 and will start half NS to help bring the sodium down.  CT chest abdomen pelvis currently pending to evaluate for any possible evidence of occult infection.  MRI of the brain given his stroke history and increased lethargy recently.  Zyvox initiated yesterday but WBC count is up to 21.2 this morning.  Blood cultures drawn from yesterday are showing no evidence of growth.  UA is not consistent with infection.  Given his known gallstones there may be occult infection there however he has been on Zosyn with a WBC count that was trending down.  Though there is no evidence on chest x-ray to suspect COVID given his high fevers I will check a respiratory PCR.  8/13 patient actually looks better today compared to the last 36 hours.  He states he has no pain and both I and general surgery palpated his right upper quadrant and he is pain-free at this time.  Having not received any pain medications.  Blood pressure still remains high but has improved with the adjustment of the medications.  His sodium is elevated at 152 and I have started him on D5W as well as increase his free water flushes.  With the initiation of D5 I have also increased his sliding scale insulin coverage.  Patient has been afebrile for the last 24 hours.  We will continue with same antibiotics at this time.  Appreciate general surgery consultation.  8/14 patient much more coherent today and able to have more than 1 word answers when questions were asked.  PT OT to work with him to see how he does  "when he gets out of bed.  General surgery planning on cholecystectomy sometime this week when appropriate.  WBC count is trending down finally and is down to 17.6.  Sodium at 149 from 152 yesterday however sugars are very high and I will discontinue the D5W and just work with free water.  He is starting to show early signs of volume overload thus the discontinuation of fluids.  Blood pressure still is very difficult to control therefore I we will add an additional Norvasc 10 mg daily.  He is still requiring as needed medications for his blood pressure.\"    8/15:   Patient having signs of volume overload, gentle diuresis, reevaluate daily  Per surgery cholecystectomy scheduled on 8/16  Discussed with speech therapy-passed fees okay for diet  Leukocytosis continues to trend down, after thorough chart review antibiotics were broadened due to worsening condition.  Patient is on day 10 of Zosyn and day 5 of linezolid will continue until surgery tomorrow and can likely discontinue afterwards.  Blood sugars have been elevated, likely due to tube feeds, patient now on regular diet/fastings we will monitor overnight and adjust insulin scale     8/16:   Is improved but yesterday patient had acute deterioration in status, mental status this morning.  Rapid response was called upon my evaluation at bedside patient was unresponsive, labs, chest x-ray, EKG unremarkable for acute findings.  A stat CT was ordered which showed interval worsening of stroke.  Case was discussed with critical care and patient was transferred to ICU for possible intubation to protect airway.      I have discussed this patient's plan of care and discharge plan at IDT rounds today with Case Management, Nursing, Nursing leadership, and other members of the IDT team.    Consultants/Specialty  General surgery    Code Status  DNAR, I OK    Disposition  The patient is not medically cleared for discharge to home or a post-acute facility.  Anticipate discharge " to: skilled nursing facility    I have placed the appropriate orders for post-discharge needs.    Review of Systems  Review of Systems   Constitutional:  Negative for chills and fever.   HENT:  Negative for congestion and sore throat.    Eyes:  Negative for blurred vision and photophobia.   Respiratory:  Negative for cough and shortness of breath.    Cardiovascular:  Negative for chest pain, claudication and leg swelling.   Gastrointestinal:  Negative for abdominal pain, constipation, diarrhea, heartburn, nausea and vomiting.   Genitourinary:  Negative for dysuria and hematuria.   Musculoskeletal:  Negative for joint pain and myalgias.   Skin:  Negative for itching and rash.   Neurological:  Negative for dizziness, sensory change, speech change, weakness and headaches.   Psychiatric/Behavioral:  Negative for depression. The patient is not nervous/anxious and does not have insomnia.    All other systems reviewed and are negative.       Physical Exam  Temp:  [36.6 °C (97.9 °F)-37.1 °C (98.8 °F)] 37.1 °C (98.8 °F)  Pulse:  [] 100  Resp:  [19-30] 30  BP: (125-202)/(61-93) 202/72  SpO2:  [91 %-97 %] 93 %    Physical Exam  Vitals and nursing note reviewed.   Constitutional:       General: He is in acute distress.      Appearance: He is ill-appearing and toxic-appearing. He is not diaphoretic.   HENT:      Head: Normocephalic and atraumatic.   Eyes:      General: No scleral icterus.     Extraocular Movements: Extraocular movements intact.   Cardiovascular:      Rate and Rhythm: Normal rate and regular rhythm.      Pulses: Normal pulses.      Heart sounds: Normal heart sounds. No murmur heard.  Pulmonary:      Effort: Pulmonary effort is normal. No respiratory distress.      Breath sounds: Normal breath sounds. No wheezing, rhonchi or rales.      Comments: Intermittently tachypneic when anxious  Abdominal:      General: Abdomen is flat. Bowel sounds are normal. There is no distension.      Palpations: Abdomen is soft.       Tenderness: There is no abdominal tenderness. There is no guarding or rebound.   Musculoskeletal:         General: No swelling or tenderness.      Cervical back: Normal range of motion and neck supple.   Lymphadenopathy:      Cervical: No cervical adenopathy.   Skin:     Coloration: Skin is not jaundiced.      Findings: No erythema.   Neurological:      General: No focal deficit present.      Mental Status: He is oriented to person, place, and time. Mental status is at baseline.      Cranial Nerves: No cranial nerve deficit.      Motor: No weakness (Right-sided arm hemiparesis).   Psychiatric:         Mood and Affect: Mood normal.         Fluids    Intake/Output Summary (Last 24 hours) at 8/16/2023 1508  Last data filed at 8/16/2023 1100  Gross per 24 hour   Intake 3163.97 ml   Output 910 ml   Net 2253.97 ml       Laboratory  Recent Labs     08/14/23  0147 08/15/23  0302 08/16/23  0040   WBC 17.6* 12.4* 13.1*   RBC 4.22* 4.16* 4.40*   HEMOGLOBIN 11.5* 11.1* 11.9*   HEMATOCRIT 38.7* 37.5* 39.5*   MCV 91.7 90.1 89.8   MCH 27.3 26.7* 27.0   MCHC 29.7* 29.6* 30.1*   RDW 53.4* 50.4* 50.6*   PLATELETCT 262 315 413   MPV 11.7 11.5 11.8     Recent Labs     08/14/23  0147 08/15/23  0302 08/16/23  0040   SODIUM 149* 145 144   POTASSIUM 3.8 3.7 4.0   CHLORIDE 111 109 106   CO2 29 28 24   GLUCOSE 327* 257* 291*   BUN 28* 26* 28*   CREATININE 0.88 0.70 0.78   CALCIUM 8.1* 8.1* 8.3*                   Imaging  CT-CEREBRAL PERFUSION ANALYSIS   Final Result      1.  Cerebral blood flow less than 30% likely representing completed infarct = 0 mL.      2.  T Max more than 6 seconds likely representing combination of completed infarct and ischemia = 31 mL.      3.  Mismatched volume likely representing ischemic brain/penumbra = 31 mL      Please note that the cerebral perfusion was performed on the limited brain tissue around the basal ganglia region. Infarct/ischemia outside the CT perfusion sections can be missed in this study.       CT-CTA NECK WITH & W/O-POST PROCESSING   Final Result      1.  Extensive calcific atherosclerotic plaque at the right cervical carotid bifurcation with estimated 50% stenosis.   2.  Left cervical carotid bifurcation calcific atherosclerotic plaque with estimated less than 50% proximal left ICA stenosis   3.  Dominant caliber right cervical vertebral artery.   4.  Poorly visualized hypoplastic, possibly segmentally occluded left vertebral artery, segmentally visualized distally. Does not appear to join the vertebrobasilar confluence.      CT-CTA HEAD WITH & W/O-POST PROCESS   Final Result      1.  Dominant distal right vertebral artery. The distal left vertebral artery is not well visualized and may terminate in a PICA. Basilar artery is patent.   2.  No large vessel occlusion or aneurysm evident in the anterior circulation.   3.  Incidental 8 mm low anterior frontal falcine meningioma      CT-HEAD W/O   Final Result      1.  Encephalomalacic changes with volume loss in the left hemisphere associated with sulcal enlargement and ex vacuo ventricular dilatation, findings consistent with old infarcts.   2.  Acute infarction involving the right frontal corona radiata, internal capsule, and basal ganglia. The area of hypodensity on today's CT scan is greater than the area of bright diffusion signal seen on MRI study 8/12/2023 suggesting progression of    this area of infarction.         DX-CHEST-PORTABLE (1 VIEW)   Final Result         No significant interval change.      DX-CHEST-PORTABLE (1 VIEW)   Final Result         Diffuse interstitial prominence could relate to mild fluid overload.      Bilateral pleural effusion with bibasilar atelectasis.      MR-BRAIN-WITH & W/O   Final Result      1.  RIGHT basal ganglia lacunar infarction   2.  No hemorrhage   3.  LEFT supratentorial encephalomalacia as described above with Wallerian degeneration in the ipsilateral brainstem   4.  6 mm frontal meningioma   5.  Atrophy    6.  White matter changes      CT-CHEST,ABDOMEN,PELVIS WITH   Final Result      1.  Acute pancreatitis with peripancreatic fluid but no pancreatic necrosis or pseudocyst      2.  Cholelithiasis      3.  Dilated gallbladder, nonspecific although could indicate cholecystitis in the appropriate clinical setting      4.  Mild bilateral atelectasis and small bilateral pleural effusion      5.  Hepatic steatosis and hepatomegaly      6.  Enteric catheter and bladder catheter appear appropriately located      DX-CHEST-PORTABLE (1 VIEW)   Final Result      1.  Bibasilar atelectasis, less likely pneumonitis.      2.  Small bilateral pleural effusions.      DX-CHEST-LIMITED (1 VIEW)   Final Result      Stable bilateral atelectasis/consolidation.      [id number] VIEW   Final Result      NG tube tip overlies the gastric antrum.      DX-ABDOMEN FOR TUBE PLACEMENT   Final Result      Malpositioned nasogastric tube. The tube extends to the gastroesophageal junction and then reverses course with tip located within the upper thoracic esophagus.      A report was called to the patient's ICU nurse at 2:55 PM on 8/9/2023      US-RUQ   Final Result      1.  Gallstones within the gallbladder. No evidence of biliary ductal dilatation.      2.  The liver is echogenic consistent with fatty change versus hepatocellular dysfunction.      DX-ABDOMEN FOR TUBE PLACEMENT   Final Result      1.  NG tube courses into the fundus of the stomach      DX-CHEST-LIMITED (1 VIEW)   Final Result         Groundglass and airspace opacities bilaterally, slightly worse than prior      DX-CHEST-PORTABLE (1 VIEW)   Final Result      1.  Increased patchy bilateral airspace opacities.   2.  Probable small bilateral pleural effusions.   3.  Interval placement of an enteric feeding tube which courses towards the stomach with the distal tip not visualized.   4.  Stable enlargement of the cardiomediastinal silhouette.      DX-ABDOMEN FOR TUBE PLACEMENT   Final  Result      NG tube tip overlies the gastric body.      EC-ECHOCARDIOGRAM COMPLETE W/ CONT   Final Result      DX-CHEST-PORTABLE (1 VIEW)   Final Result      Bibasilar atelectasis and small bilateral pleural effusions, similar to prior study.         CT-CTA CHEST PULMONARY ARTERY W/ RECONS   Final Result         1. No pulmonary embolus.   2. Mild bibasilar airspace disease, atelectasis versus infiltrate   3. Small simple right pleural effusion.   4. Mild ascites in the upper quadrant.   5. Minimal subsegmental atelectasis in upper lung zones.            DX-CHEST-PORTABLE (1 VIEW)   Final Result      No significant change from prior exam.      [id number]   Final Result         1. No choledocholithiasis. No CBD dilatation.      2. Cholelithiasis.      3. Fluid surrounding the pancreas, in keeping with acute pancreatitis.      US-RUQ   Final Result      1.  Hepatic steatosis.      2.  Cholelithiasis.      CT-ABDOMEN-PELVIS WITH   Final Result         1.  Cholelithiasis.      2.  Marked acute pancreatitis      DX-CHEST-PORTABLE (1 VIEW)   Final Result      Hypoinflation with borderline cardiomegaly. No lobar consolidation or large pleural effusions.                Assessment/Plan  * Acute biliary pancreatitis without infection or necrosis- (present on admission)  Assessment & Plan  Does have some distention, but no abdominal pain  Last lipase 67, radiographic findings on CT still showing significant pancreatitis with peripancreatic fluid, no evidence of necrosis or pseudocyst  General surgery consulted, follow recommendations  Per surgery cholecystectomy scheduled on 8/16-consulted to patient's altered mental status    Volume overload  Assessment & Plan  Patient having signs of volume overload, gentle diuresis, reevaluate daily    CVA (cerebral vascular accident) (HCC)  Assessment & Plan  MRI brain did show a small right-sided lacunar infarct, this should not have resulted in leukocytosis or fevers.  It does appear  the patient has had worsening weakness, speech and swallowing, he does have deficits from previous stroke  PT/OT  We will likely need postacute care placement  Discussed with case management if patient is resident of [facility] patient    History of CVA (cerebrovascular accident)  Assessment & Plan  Residual right arm hemiparesis  Aspirin/Plavix to remain on hold as he will need upcoming surgery and this would delay it further.  Please caution with mobilizing  PT OT to continue  MRI brain shows a right basal ganglia lacunar infarct, no hemorrhage and left supratentorial encephalomalacia likely related to his previous strokes.    Acutely worsening mental status  Stat CT head, CTA head and neck: Interval worsening of infarct  Transfer to ICU    Stage 3a chronic kidney disease (HCC)- (present on admission)  Assessment & Plan  Avoid/minimize nephrotoxins as much as possible, renally dose medications, monitor inputs and outputs   Daily labs    Type 2 diabetes mellitus with kidney complication, without long-term current use of insulin (HCC)- (present on admission)  Assessment & Plan  Blood sugars have been elevated, likely due to tube feeds, patient now on regular diet/fastings we will monitor overnight and adjust insulin scale   High insulin sliding scale coverage  If blood sugar remains high will likely need long-acting but will hold for now given n.p.o. status    Leukocytosis- (present on admission)  Assessment & Plan  Leukocytosis continues to trend down, after thorough chart review antibiotics were broadened due to worsening condition.    Patient is on day 10 of Zosyn and day 5 of linezolid will continue until surgery tomorrow and can likely discontinue afterwards.  CT chest abdomen pelvis just shows the known gallstones, dilated gallbladder and pancreatitis with peripancreatic fluid, no evidence of abscess or other etiology to explain fevers, leukocytosis  Chest x-ray shows stable edema, improving  overall  UA not consistent with infection  Blood cultures collected 8/11 and currently showing no growth    Gastroesophageal reflux disease without esophagitis- (present on admission)  Assessment & Plan  Continue omeprazole    Anxiety- (present on admission)  Assessment & Plan  Continue fluoxetine when no longer NPO  The acute withdrawal of this medication could be contributing to some of his confusion.      Symptomatic cholelithiasis- (present on admission)  Assessment & Plan  Repeat CT chest abdomen pelvis shows no evidence of abscess, he still has significant pancreatitis and peripancreatic fluid but his gallbladder with palpation is no longer tender.  General surgery was consulted and does not feel that the fevers and the confusion, impulsivity and WBC count is related to an acute cholecystitis.  Per surgery cholecystectomy scheduled on 8/16-canceled due to patient's altered mental status         VTE prophylaxis:   SCDs/TEDs      I have performed a physical exam and reviewed and updated ROS and Plan today (8/16/2023). In review of yesterday's note (8/15/2023), there are no changes except as documented above.    Patient is critically ill.   The patient continues to have: altered mental status  The vital organ system that is affected is the: neurological  If untreated there is a high chance of deterioration into: stroke, encephalopathy   And eventually death.   The critical care that I am providing today is: Evaluation management and to have reports problems, worsening mental status and deterioration  The critical that has been undertaken is medically complex.   There has been no overlap in critical care time.   Critical Care Time not including procedures: 45       Yes
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
## 2023-09-08 ENCOUNTER — RESULT REVIEW (OUTPATIENT)
Age: 53
End: 2023-09-08

## 2023-09-08 ENCOUNTER — OUTPATIENT (OUTPATIENT)
Dept: OUTPATIENT SERVICES | Facility: HOSPITAL | Age: 53
LOS: 1 days | End: 2023-09-08
Payer: MEDICAID

## 2023-09-08 ENCOUNTER — APPOINTMENT (OUTPATIENT)
Dept: RADIOLOGY | Facility: IMAGING CENTER | Age: 53
End: 2023-09-08
Payer: MEDICAID

## 2023-09-08 DIAGNOSIS — Z98.51 TUBAL LIGATION STATUS: Chronic | ICD-10-CM

## 2023-09-08 DIAGNOSIS — Z00.8 ENCOUNTER FOR OTHER GENERAL EXAMINATION: ICD-10-CM

## 2023-09-08 PROCEDURE — 71046 X-RAY EXAM CHEST 2 VIEWS: CPT

## 2023-09-08 PROCEDURE — 71046 X-RAY EXAM CHEST 2 VIEWS: CPT | Mod: 26

## 2023-10-09 NOTE — ED ADULT NURSE NOTE - CAS EDP DISCH DISPOSITION ADMI
[facility] Dutasteride Male Counseling: Dustasteride Counseling:  I discussed with the patient the risks of use of dutasteride including but not limited to decreased libido, decreased ejaculate volume, and gynecomastia. Women who can become pregnant should not handle medication.  All of the patient's questions and concerns were addressed. Dutasteride Counseling: Dustasteride Counseling:  I discussed with the patient the risks of use of dutasteride including but not limited to decreased libido, decreased ejaculate volume, and gynecomastia. Women who can become pregnant should not handle medication.  All of the patient's questions and concerns were addressed.

## 2024-05-08 NOTE — ED ADULT TRIAGE NOTE - PAIN RATING/NUMBER SCALE (0-10): REST
Schedule Lumbar Intra-articular Facet Steroid Injection Bilateral L4-L5 and L5-S1 in procedure room. Room 24 8

## 2024-05-22 ENCOUNTER — EMERGENCY (EMERGENCY)
Facility: HOSPITAL | Age: 54
LOS: 1 days | Discharge: HOME | End: 2024-05-22
Attending: EMERGENCY MEDICINE | Admitting: EMERGENCY MEDICINE
Payer: MEDICAID

## 2024-05-22 VITALS
TEMPERATURE: 98 F | HEART RATE: 75 BPM | RESPIRATION RATE: 16 BRPM | DIASTOLIC BLOOD PRESSURE: 82 MMHG | SYSTOLIC BLOOD PRESSURE: 127 MMHG | OXYGEN SATURATION: 100 %

## 2024-05-22 VITALS
OXYGEN SATURATION: 100 % | DIASTOLIC BLOOD PRESSURE: 73 MMHG | SYSTOLIC BLOOD PRESSURE: 118 MMHG | RESPIRATION RATE: 18 BRPM | TEMPERATURE: 98 F | HEART RATE: 75 BPM

## 2024-05-22 DIAGNOSIS — Z98.51 TUBAL LIGATION STATUS: Chronic | ICD-10-CM

## 2024-05-22 LAB
ALBUMIN SERPL ELPH-MCNC: 4.5 G/DL — SIGNIFICANT CHANGE UP (ref 3.3–5)
ALP SERPL-CCNC: 90 U/L — SIGNIFICANT CHANGE UP (ref 40–120)
ALT FLD-CCNC: 25 U/L — SIGNIFICANT CHANGE UP (ref 4–33)
ANION GAP SERPL CALC-SCNC: 16 MMOL/L — HIGH (ref 7–14)
AST SERPL-CCNC: 42 U/L — HIGH (ref 4–32)
BASOPHILS # BLD AUTO: 0.05 K/UL — SIGNIFICANT CHANGE UP (ref 0–0.2)
BASOPHILS NFR BLD AUTO: 0.7 % — SIGNIFICANT CHANGE UP (ref 0–2)
BILIRUB SERPL-MCNC: 1.1 MG/DL — SIGNIFICANT CHANGE UP (ref 0.2–1.2)
BUN SERPL-MCNC: 10 MG/DL — SIGNIFICANT CHANGE UP (ref 7–23)
CALCIUM SERPL-MCNC: 9.4 MG/DL — SIGNIFICANT CHANGE UP (ref 8.4–10.5)
CHLORIDE SERPL-SCNC: 104 MMOL/L — SIGNIFICANT CHANGE UP (ref 98–107)
CO2 SERPL-SCNC: 16 MMOL/L — LOW (ref 22–31)
CREAT SERPL-MCNC: 0.51 MG/DL — SIGNIFICANT CHANGE UP (ref 0.5–1.3)
EGFR: 112 ML/MIN/1.73M2 — SIGNIFICANT CHANGE UP
EOSINOPHIL # BLD AUTO: 0.18 K/UL — SIGNIFICANT CHANGE UP (ref 0–0.5)
EOSINOPHIL NFR BLD AUTO: 2.6 % — SIGNIFICANT CHANGE UP (ref 0–6)
GLUCOSE SERPL-MCNC: 93 MG/DL — SIGNIFICANT CHANGE UP (ref 70–99)
HCG SERPL-ACNC: 1 MIU/ML — SIGNIFICANT CHANGE UP
HCT VFR BLD CALC: 42.7 % — SIGNIFICANT CHANGE UP (ref 34.5–45)
HGB BLD-MCNC: 13.9 G/DL — SIGNIFICANT CHANGE UP (ref 11.5–15.5)
IANC: 3.43 K/UL — SIGNIFICANT CHANGE UP (ref 1.8–7.4)
IMM GRANULOCYTES NFR BLD AUTO: 0.1 % — SIGNIFICANT CHANGE UP (ref 0–0.9)
LYMPHOCYTES # BLD AUTO: 2.88 K/UL — SIGNIFICANT CHANGE UP (ref 1–3.3)
LYMPHOCYTES # BLD AUTO: 41.3 % — SIGNIFICANT CHANGE UP (ref 13–44)
MCHC RBC-ENTMCNC: 28.9 PG — SIGNIFICANT CHANGE UP (ref 27–34)
MCHC RBC-ENTMCNC: 32.6 GM/DL — SIGNIFICANT CHANGE UP (ref 32–36)
MCV RBC AUTO: 88.8 FL — SIGNIFICANT CHANGE UP (ref 80–100)
MONOCYTES # BLD AUTO: 0.42 K/UL — SIGNIFICANT CHANGE UP (ref 0–0.9)
MONOCYTES NFR BLD AUTO: 6 % — SIGNIFICANT CHANGE UP (ref 2–14)
NEUTROPHILS # BLD AUTO: 3.43 K/UL — SIGNIFICANT CHANGE UP (ref 1.8–7.4)
NEUTROPHILS NFR BLD AUTO: 49.3 % — SIGNIFICANT CHANGE UP (ref 43–77)
NRBC # BLD: 0 /100 WBCS — SIGNIFICANT CHANGE UP (ref 0–0)
NRBC # FLD: 0 K/UL — SIGNIFICANT CHANGE UP (ref 0–0)
NT-PROBNP SERPL-SCNC: <36 PG/ML — SIGNIFICANT CHANGE UP
PLATELET # BLD AUTO: 283 K/UL — SIGNIFICANT CHANGE UP (ref 150–400)
POTASSIUM SERPL-MCNC: 4.9 MMOL/L — SIGNIFICANT CHANGE UP (ref 3.5–5.3)
POTASSIUM SERPL-SCNC: 4.9 MMOL/L — SIGNIFICANT CHANGE UP (ref 3.5–5.3)
PROT SERPL-MCNC: 8 G/DL — SIGNIFICANT CHANGE UP (ref 6–8.3)
RBC # BLD: 4.81 M/UL — SIGNIFICANT CHANGE UP (ref 3.8–5.2)
RBC # FLD: 12.2 % — SIGNIFICANT CHANGE UP (ref 10.3–14.5)
SODIUM SERPL-SCNC: 136 MMOL/L — SIGNIFICANT CHANGE UP (ref 135–145)
TROPONIN T, HIGH SENSITIVITY RESULT: <6 NG/L — SIGNIFICANT CHANGE UP
WBC # BLD: 6.97 K/UL — SIGNIFICANT CHANGE UP (ref 3.8–10.5)
WBC # FLD AUTO: 6.97 K/UL — SIGNIFICANT CHANGE UP (ref 3.8–10.5)

## 2024-05-22 PROCEDURE — 71046 X-RAY EXAM CHEST 2 VIEWS: CPT | Mod: 26

## 2024-05-22 PROCEDURE — 99285 EMERGENCY DEPT VISIT HI MDM: CPT

## 2024-05-22 PROCEDURE — 93010 ELECTROCARDIOGRAM REPORT: CPT

## 2024-05-22 RX ORDER — LIDOCAINE 4 G/100G
1 CREAM TOPICAL ONCE
Refills: 0 | Status: COMPLETED | OUTPATIENT
Start: 2024-05-22 | End: 2024-05-22

## 2024-05-22 RX ORDER — SODIUM CHLORIDE 9 MG/ML
1000 INJECTION INTRAMUSCULAR; INTRAVENOUS; SUBCUTANEOUS ONCE
Refills: 0 | Status: COMPLETED | OUTPATIENT
Start: 2024-05-22 | End: 2024-05-22

## 2024-05-22 RX ORDER — CYCLOBENZAPRINE HYDROCHLORIDE 10 MG/1
10 TABLET, FILM COATED ORAL ONCE
Refills: 0 | Status: COMPLETED | OUTPATIENT
Start: 2024-05-22 | End: 2024-05-22

## 2024-05-22 RX ORDER — CYCLOBENZAPRINE HYDROCHLORIDE 10 MG/1
1 TABLET, FILM COATED ORAL
Qty: 21 | Refills: 0
Start: 2024-05-22 | End: 2024-05-28

## 2024-05-22 RX ORDER — IBUPROFEN 200 MG
1 TABLET ORAL
Qty: 28 | Refills: 0
Start: 2024-05-22 | End: 2024-05-28

## 2024-05-22 RX ORDER — LIDOCAINE 4 G/100G
1 CREAM TOPICAL
Qty: 1 | Refills: 0
Start: 2024-05-22

## 2024-05-22 RX ORDER — KETOROLAC TROMETHAMINE 30 MG/ML
15 SYRINGE (ML) INJECTION ONCE
Refills: 0 | Status: DISCONTINUED | OUTPATIENT
Start: 2024-05-22 | End: 2024-05-22

## 2024-05-22 RX ADMIN — SODIUM CHLORIDE 1000 MILLILITER(S): 9 INJECTION INTRAMUSCULAR; INTRAVENOUS; SUBCUTANEOUS at 21:24

## 2024-05-22 RX ADMIN — LIDOCAINE 1 PATCH: 4 CREAM TOPICAL at 20:45

## 2024-05-22 RX ADMIN — CYCLOBENZAPRINE HYDROCHLORIDE 10 MILLIGRAM(S): 10 TABLET, FILM COATED ORAL at 20:44

## 2024-05-22 RX ADMIN — Medication 15 MILLIGRAM(S): at 20:45

## 2024-05-22 NOTE — ED ADULT NURSE NOTE - AS TEMP SITE
oral Albendazole Counseling:  I discussed with the patient the risks of albendazole including but not limited to cytopenia, kidney damage, nausea/vomiting and severe allergy.  The patient understands that this medication is being used in an off-label manner.

## 2024-05-22 NOTE — ED ADULT NURSE NOTE - OBJECTIVE STATEMENT
pt received to rm 28  , a&ox4 , ambulatory , phx of HTN and DM 2 , p/w chest pain and dizziness starting yesterday  . Pt breathing even and unlabored on room air. NSR on cardiac monitor. Denies fever, chills, cough, SOB,  nausea, vomiting, diarrhea, constipation, numbness, tingling. pending MD chen . pt educated on fall precautions and confirms understanding via teach back method. Stretcher locked in lowest position with siderails up x2. Call bell and personal items within reach.

## 2024-05-22 NOTE — ED PROVIDER NOTE - OBJECTIVE STATEMENT
54 y/o F with PMH of DM2, HLD, and MVP presents c/o chest pain since yesterday. Patient notes that she started to have the pain at around 3pm yesterday while she was sitting at work. She describes the pain as pressure over the midchest that radiates the back, left side neck, and left arm. The pain is worse with movements of the left arm and when pressure is applied over the areas of discomfort. The pain improved last night but began again at around noon today and has been persistent since prompting her current visit. She did not take any medication to help PTA. Never had this before. Denies any other complaints or concerns. Denies SOB, cough, fevers, chills, abdominal pain, N/V/D/C, urinary complaints, HA, dizziness, numbness, tingling, weakness, trauma, injuries, falls, sick contacts, recent travel.

## 2024-05-22 NOTE — ED ADULT TRIAGE NOTE - CHIEF COMPLAINT QUOTE
Pt. c/o left sided chest pain radiating to back, neck and left arm, tingling to left arm, blurry vision and nausea since yesterday. Denies sob, dizziness, vomiting. hx of HLD and DM

## 2024-05-22 NOTE — ED PROVIDER NOTE - PATIENT PORTAL LINK FT
You can access the FollowMyHealth Patient Portal offered by [facility] by registering at the following website: [email or web address]. By joining [employer]’s FollowMyHealth portal, you will also be able to view your health information using other applications (apps) compatible with our system.

## 2024-05-22 NOTE — ED PROVIDER NOTE - PHYSICAL EXAMINATION
CONSTITUTIONAL: Comfortable; in no acute distress. Non-toxic appearing.   NEURO: Alert & oriented. Sensory and motor functions are grossly intact.  PSYCH: Mood appropriate. Thought processes intact.   HEAD: NCAT  CARD: Regular rate and rhythm, no murmurs  RESP: No accessory muscle use; breath sounds clear and equal bilaterally; no wheezes, rhonchi, or rales     MUSCULOSKELETAL/EXTREMITIES: (+) reproducible tenderness over the left side trapezius muscle with increased pain upon ROM at the left shoulder, neurovascularly intact. Rest of extremities: WNL.  SKIN: Warm; dry; no apparent lesions or exudate

## 2024-05-22 NOTE — ED PROVIDER NOTE - CLINICAL SUMMARY MEDICAL DECISION MAKING FREE TEXT BOX
52 y/o F with PMH of DM2, HLD, and MVP presents c/o chest pain since yesterday. Will obtain blood work and cxr to eval for cardiac causes of pain although it sounds more musculoskeletal in nature. Will provide treatment with toradol, flexeril, and lido patch. Will reassess. 54 y/o F with PMH of DM2, HLD, and MVP presents c/o chest pain since yesterday. Will obtain blood work and cxr to eval for cardiac causes of pain although it sounds more musculoskeletal in nature. Will provide treatment with toradol, flexeril, and lido patch. Will reassess.  Lab results wnl.  CXR unremarkable for acute findings.  EKG is NS and non-ischemic.  Patient felt better in the ED after treatment. Likely musculoskeletal pain. Will send rx for nsaids and muscle relaxant.  Patient comfortable and stable for discharge with pcp follow up.  Instructed to return to the ED immediately for any worsening symptoms or new concerns.    PLAN AND FOLLOW-UP: Patient counseled on all findings, diagnosis and treatment plan. Patient's questions and concerns addressed. Patient stable, discharged with instructions to follow up with PMD, and to return to ED at any time for worsening symptoms or any other concerns. Patient demonstrates understanding of the findings and the importance of appropriate follow up care.

## 2024-05-22 NOTE — ED PROVIDER NOTE - ATTENDING APP SHARED VISIT CONTRIBUTION OF CARE
Agree with PA note  53-year-old female with history of diabetes, hyperlipidemia, mitral valve prolapse presents with chest pain since yesterday.  Patient states located over anterior chest with radiation to left arm.  Started again today.  Which prompted her to come to the emergency room no associated symptoms of shortness of breath diaphoresis or vomiting.  Not related to exertion.  States pain more over the shoulder and trapezius.  Physical exam  Well-appearing female in no respiratory distress  Vital signs stable  Clear to auscultation bilaterally  S1-S2 no murmurs rubs or gallops  Abdomen soft nontender nondistended  MSK left trapezius spasm worsening pain with movement of left shoulder  Vascular pulses equal bilaterally  Impression  Shoulder pain/chest pain  EKG normal sinus rhythm no ischemic changes  Will get basic labs including troponin, chest x-ray to rule out ACS  Per exam more likely to be musculoskeletal trapezius spasm will treat with pain medications and reassess

## 2024-05-22 NOTE — ED ADULT NURSE REASSESSMENT NOTE - NS ED NURSE REASSESS COMMENT FT1
report received from PREM Lewis. pt remains at baseline mental status A&OX4, RR even unlabored completing full clear sentences. pt resting in stretcher comfortably at this time, no new complaints offered. NAD noted. stretcher lowest position siderails up safety measures in place. awaiting MD reassessment
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
## 2024-11-21 NOTE — ED ADULT NURSE NOTE - CAS TRG GENERAL AIRWAY, MLM
Neurology follow-up virtual visit  Assessment & Plan   1. Autonomic dysfunction.  The patient reports experiencing dizziness, heart palpitations, and elevated blood pressure since having COVID-19 in September. These symptoms are likely exacerbated by her autonomic disorder. Pyridostigmine has been prescribed, starting at a low dose of half a tablet in the morning, to help manage her lightheadedness and autonomic symptoms. If the initial dose proves ineffective, the dosage will be increased to a full tablet in the morning. She is instructed to monitor for any abdominal cramping as a potential side effect.    2. Chiari I malformation.  The patient's Chiari malformation, identified on a brain MRI, may be contributing to her headaches and dizziness. A referral to neurosurgery has been made for further evaluation and to discuss the possibility of a therapeutic spinal tap to alleviate intracranial pressure. The patient has been informed about the potential risks and benefits of this procedure.    3. Headaches.  The patient reports headaches, particularly in the back of her head, which she associates with her elevated blood pressure. It is noted that her Chiari malformation may also be contributing to these symptoms. She has been advised to monitor her blood pressure and continue with her current management plan. If symptoms persist, further evaluation and potential treatment options will be considered. Also discussed the consideration of Topiramate low dose to help alleviate pressure.    Follow-up  Return in 3 months for follow-up.    1. Autonomic dysfunction  Comments:  2022 Auto lab: mild abnl d/t a postganglionic process. A SFN affecting autonomic nerves & the abnormalities seen in the CV test suggest a hyperadrenergic state  2. Chiari malformation type I  (CMD)  -     SERVICE TO NEUROLOGICAL SURGERY  3. Pressure in head  -     SERVICE TO NEUROLOGICAL SURGERY  Other orders  -     pyridostigmine (MESTINON) 60 MG tablet;  Take 30 mg PO QAM x1 week, then 30 mg QAM + Qnoon x1 week, followed by weekly increments of 30 mg until reaching 60 mg QAM + 60 mg Qnoon      See Patient Instructions for patient provided education. Findings and recommendations were discussed. All questions were answered and patient verbalized understanding of plan. After Visit Summary is available through [employer].     -----------------------------------------------------------------------------------------------------------     This visit is being performed virtually via Video visit using [employer].   Clinical Location: [facility] JOSIE Murray's location Home and is physically present in   the [facility] at the time of this visit.       Ravindra Murray is a 50 year old female who presents for No chief complaint on file.    The patient presents for evaluation of dizziness and headaches.    She reports experiencing dizziness and heart palpitations, which she believes may be related to her blood pressure. Despite taking medication, her blood pressure remains higher than normal. She sought help from an urgent care center where she was advised to take a full pill instead of half, a regimen she has been following. She has an appointment with her primary care physician tomorrow.    She experiences dizziness and a fainting sensation when standing in line, a symptom she has been dealing with for four years. Her symptoms vary daily, with some days free of neuropathy and others marked by discomfort in her feet and toes. She has been managing her condition by staying hydrated, although she has not been consuming much Gatorade recently. She has been taking low-dose naltrexone, one pill daily, but feels it intensifies her symptoms.    She also reports headaches, which she attributes to her elevated blood pressure. She has consulted with an ENT specialist and an eye doctor, both of whom have discussed her Chiari malformation. She has noticed a  delay in her cognitive processing, feeling as though her brain and body are not in sync. She is open to trying any treatment that might alleviate her symptoms.    Supplemental Information:  She had COVID-19 in 09/2024.    Review of Systems       Histories: Reviewed and updated in Epic.     Allergies    ALLERGIES:   Allergen Reactions    Bactrim Ds HIVES       Medications    Current Outpatient Medications   Medication Sig Dispense Refill    pyridostigmine (MESTINON) 60 MG tablet Take 30 mg PO QAM x1 week, then 30 mg QAM + Qnoon x1 week, followed by weekly increments of 30 mg until reaching 60 mg QAM + 60 mg Qnoon 35 tablet 0    potassium CHLORIDE (KLOR-CON M) 20 MEQ ivan ER tablet Take 1 tablet by mouth daily. 30 tablet 0    hydroCHLOROthiazide 25 MG tablet Take 0.5 tablets by mouth daily. 30 tablet 0    Naltrexone HCl Powder       ergocalciferol (DRISDOL) 1.25 mg (50,000 units) capsule Take 1 capsule by mouth 2 days a week. 24 capsule 0    modafinil (PROVIGIL) 100 MG tablet Take 1 tablet by mouth daily. (Patient not taking: Reported on 10/9/2024) 30 tablet 0    propRANolol (INDERAL) 10 MG tablet Take 1 tablet by mouth 2 times daily as needed (anxiety). (Patient not taking: Reported on 10/9/2024) 60 tablet 0    Magnesium Aspartate 65 MG Tab Per patient is taking 165 mg daily      meclizine (ANTIVERT) 25 MG tablet Take a-HALf to 1 tablet by mouth 3 times daily as needed for Dizziness. May cause drowsiness. 60 tablet 1    ketoconazole (NIZORAL) 2 % shampoo Apply to affected area(s) 2 times a week. Leave on for 5 to 10 minutes then wash off (120 mL) 120 mL 3    VITAMIN D, CHOLECALCIFEROL, PO Take 2,000 Int'l Units by mouth daily.      Ascorbic Acid (VITAMIN C PO)       Calcium 200 MG Tab Take by mouth daily.      MULTI-VITAMIN PO TABS Take by mouth daily.  0     No current facility-administered medications for this visit.       neurologic exam      There were no vitals filed for this visit.  Physical Exam  General:  Alert in no acute distress.  Respiratory: Normal respiratory effort and verbally interactive.  Psychiatric: Mood is appropriate for situation          Review of diagnostic data      Imaging  Brain MRI showed Chiari malformation with cerebellum protruding 5 to 6 mm below the skull.       Maile Rosales, MARLEY, APNP, FNP-BC   Patent

## 2024-12-27 ENCOUNTER — APPOINTMENT (OUTPATIENT)
Dept: PULMONOLOGY | Facility: CLINIC | Age: 54
End: 2024-12-27
Payer: MEDICAID

## 2024-12-27 ENCOUNTER — NON-APPOINTMENT (OUTPATIENT)
Age: 54
End: 2024-12-27

## 2024-12-27 VITALS
TEMPERATURE: 98.5 F | OXYGEN SATURATION: 99 % | HEART RATE: 92 BPM | HEIGHT: 61 IN | WEIGHT: 156 LBS | DIASTOLIC BLOOD PRESSURE: 82 MMHG | SYSTOLIC BLOOD PRESSURE: 118 MMHG | BODY MASS INDEX: 29.45 KG/M2

## 2024-12-27 DIAGNOSIS — Z86.39 PERSONAL HISTORY OF OTHER ENDOCRINE, NUTRITIONAL AND METABOLIC DISEASE: ICD-10-CM

## 2024-12-27 DIAGNOSIS — G47.9 SLEEP DISORDER, UNSPECIFIED: ICD-10-CM

## 2024-12-27 DIAGNOSIS — R06.83 SNORING: ICD-10-CM

## 2024-12-27 PROCEDURE — 99203 OFFICE O/P NEW LOW 30 MIN: CPT

## 2024-12-27 RX ORDER — METFORMIN HYDROCHLORIDE 1000 MG/1
1000 TABLET, COATED ORAL
Refills: 0 | Status: ACTIVE | COMMUNITY

## 2025-01-27 NOTE — BRIEF OPERATIVE NOTE - OPERATION/FINDINGS
Where Is Your Acne Located?: Face
Additional Comments (Use Complete Sentences): Patient states she has only been taking 100mg of spironolactone in stead of 150mg as she felt that would have been too much with her other medications. She states that she has still seen some improvement with her acne compared to her last office visit.
see full op report

## 2025-02-14 ENCOUNTER — APPOINTMENT (OUTPATIENT)
Dept: PULMONOLOGY | Facility: CLINIC | Age: 55
End: 2025-02-14
Payer: COMMERCIAL

## 2025-02-14 ENCOUNTER — NON-APPOINTMENT (OUTPATIENT)
Age: 55
End: 2025-02-14

## 2025-02-14 VITALS
BODY MASS INDEX: 28.34 KG/M2 | HEART RATE: 86 BPM | DIASTOLIC BLOOD PRESSURE: 84 MMHG | SYSTOLIC BLOOD PRESSURE: 120 MMHG | TEMPERATURE: 97.3 F | OXYGEN SATURATION: 99 % | WEIGHT: 150 LBS

## 2025-02-14 DIAGNOSIS — G47.30 SLEEP APNEA, UNSPECIFIED: ICD-10-CM

## 2025-02-14 PROCEDURE — 99214 OFFICE O/P EST MOD 30 MIN: CPT

## 2025-02-14 RX ORDER — SEMAGLUTIDE 0.68 MG/ML
2 INJECTION, SOLUTION SUBCUTANEOUS
Refills: 0 | Status: ACTIVE | COMMUNITY

## 2025-02-15 PROBLEM — G47.30 APNEA, SLEEP: Status: ACTIVE | Noted: 2025-02-15

## 2025-05-28 ENCOUNTER — EMERGENCY (EMERGENCY)
Facility: HOSPITAL | Age: 55
LOS: 1 days | End: 2025-05-28
Attending: EMERGENCY MEDICINE | Admitting: EMERGENCY MEDICINE
Payer: MEDICAID

## 2025-05-28 VITALS
WEIGHT: 147.93 LBS | SYSTOLIC BLOOD PRESSURE: 144 MMHG | HEART RATE: 77 BPM | RESPIRATION RATE: 16 BRPM | TEMPERATURE: 98 F | OXYGEN SATURATION: 99 % | DIASTOLIC BLOOD PRESSURE: 67 MMHG

## 2025-05-28 VITALS
SYSTOLIC BLOOD PRESSURE: 140 MMHG | DIASTOLIC BLOOD PRESSURE: 65 MMHG | RESPIRATION RATE: 17 BRPM | HEART RATE: 75 BPM | OXYGEN SATURATION: 99 %

## 2025-05-28 DIAGNOSIS — Z98.51 TUBAL LIGATION STATUS: Chronic | ICD-10-CM

## 2025-05-28 LAB
ANION GAP SERPL CALC-SCNC: 11 MMOL/L — SIGNIFICANT CHANGE UP (ref 7–14)
BUN SERPL-MCNC: 7 MG/DL — SIGNIFICANT CHANGE UP (ref 7–23)
CALCIUM SERPL-MCNC: 9 MG/DL — SIGNIFICANT CHANGE UP (ref 8.4–10.5)
CHLORIDE SERPL-SCNC: 103 MMOL/L — SIGNIFICANT CHANGE UP (ref 98–107)
CO2 SERPL-SCNC: 26 MMOL/L — SIGNIFICANT CHANGE UP (ref 22–31)
CREAT SERPL-MCNC: 0.42 MG/DL — LOW (ref 0.5–1.3)
EGFR: 117 ML/MIN/1.73M2 — SIGNIFICANT CHANGE UP
EGFR: 117 ML/MIN/1.73M2 — SIGNIFICANT CHANGE UP
GLUCOSE SERPL-MCNC: 173 MG/DL — HIGH (ref 70–99)
HCT VFR BLD CALC: 38.8 % — SIGNIFICANT CHANGE UP (ref 34.5–45)
HGB BLD-MCNC: 13.5 G/DL — SIGNIFICANT CHANGE UP (ref 11.5–15.5)
MCHC RBC-ENTMCNC: 29.6 PG — SIGNIFICANT CHANGE UP (ref 27–34)
MCHC RBC-ENTMCNC: 34.8 G/DL — SIGNIFICANT CHANGE UP (ref 32–36)
MCV RBC AUTO: 85.1 FL — SIGNIFICANT CHANGE UP (ref 80–100)
NRBC # BLD AUTO: 0 K/UL — SIGNIFICANT CHANGE UP (ref 0–0)
NRBC # FLD: 0 K/UL — SIGNIFICANT CHANGE UP (ref 0–0)
NRBC BLD AUTO-RTO: 0 /100 WBCS — SIGNIFICANT CHANGE UP (ref 0–0)
PLATELET # BLD AUTO: 316 K/UL — SIGNIFICANT CHANGE UP (ref 150–400)
POTASSIUM SERPL-MCNC: 3.7 MMOL/L — SIGNIFICANT CHANGE UP (ref 3.5–5.3)
POTASSIUM SERPL-SCNC: 3.7 MMOL/L — SIGNIFICANT CHANGE UP (ref 3.5–5.3)
RBC # BLD: 4.56 M/UL — SIGNIFICANT CHANGE UP (ref 3.8–5.2)
RBC # FLD: 12.5 % — SIGNIFICANT CHANGE UP (ref 10.3–14.5)
SODIUM SERPL-SCNC: 140 MMOL/L — SIGNIFICANT CHANGE UP (ref 135–145)
WBC # BLD: 6.24 K/UL — SIGNIFICANT CHANGE UP (ref 3.8–10.5)
WBC # FLD AUTO: 6.24 K/UL — SIGNIFICANT CHANGE UP (ref 3.8–10.5)

## 2025-05-28 PROCEDURE — 99285 EMERGENCY DEPT VISIT HI MDM: CPT

## 2025-05-28 PROCEDURE — 70498 CT ANGIOGRAPHY NECK: CPT | Mod: 26

## 2025-05-28 RX ORDER — LIDOCAINE HYDROCHLORIDE 20 MG/ML
1 JELLY TOPICAL ONCE
Refills: 0 | Status: COMPLETED | OUTPATIENT
Start: 2025-05-28 | End: 2025-05-28

## 2025-05-28 RX ORDER — CYCLOBENZAPRINE HYDROCHLORIDE 15 MG/1
5 CAPSULE, EXTENDED RELEASE ORAL ONCE
Refills: 0 | Status: COMPLETED | OUTPATIENT
Start: 2025-05-28 | End: 2025-05-28

## 2025-05-28 RX ORDER — KETOROLAC TROMETHAMINE 30 MG/ML
15 INJECTION, SOLUTION INTRAMUSCULAR; INTRAVENOUS ONCE
Refills: 0 | Status: DISCONTINUED | OUTPATIENT
Start: 2025-05-28 | End: 2025-05-28

## 2025-05-28 RX ORDER — ACETAMINOPHEN 500 MG/5ML
975 LIQUID (ML) ORAL ONCE
Refills: 0 | Status: COMPLETED | OUTPATIENT
Start: 2025-05-28 | End: 2025-05-28

## 2025-05-28 RX ADMIN — Medication 975 MILLIGRAM(S): at 13:49

## 2025-05-28 RX ADMIN — CYCLOBENZAPRINE HYDROCHLORIDE 5 MILLIGRAM(S): 15 CAPSULE, EXTENDED RELEASE ORAL at 13:49

## 2025-05-28 RX ADMIN — KETOROLAC TROMETHAMINE 15 MILLIGRAM(S): 30 INJECTION, SOLUTION INTRAMUSCULAR; INTRAVENOUS at 13:49

## 2025-05-28 RX ADMIN — LIDOCAINE HYDROCHLORIDE 1 PATCH: 20 JELLY TOPICAL at 13:50

## 2025-05-28 NOTE — ED PROVIDER NOTE - CLINICAL SUMMARY MEDICAL DECISION MAKING FREE TEXT BOX
Jemal: Likely cervical OA. Consider carotid disease (middle-age, [place]-origin, metabolic syndrome). Check CT angio neck. Pain meds.

## 2025-05-28 NOTE — ED PROVIDER NOTE - ATTENDING CONTRIBUTION TO CARE
I performed a face-to-face evaluation of the patient and performed a history and physical examination. I agree with the history and physical examination. If this was a PA visit, I personally saw the patient with the PA and performed a substantive portion of the visit including all aspects of the medical decision making.    Likely cervical OA. Consider carotid disease (middle-age, [place]-origin, metabolic syndrome). Check CT angio neck. Pain meds.

## 2025-05-28 NOTE — ED ADULT NURSE NOTE - OBJECTIVE STATEMENT
Pt arrives ambulatory on room air c/o left sided neck pain x2 weeks. Pt denies any trauma/falls/injury. Pt respirations even and unlabored, chest rise and fall equal b/l. Pt denies chest pain, HA, SOB, dizziness, N/V/D, fever/chills. 20g placed to LAC, labs collected and sent. Pt medicated per EMAR. Pt pending CT. Stretcher in lowest position, call bell within reach, pt safety maintained.

## 2025-05-28 NOTE — ED PROVIDER NOTE - NSFOLLOWUPINSTRUCTIONS_ED_ALL_ED_FT
Advance activity as tolerated.  Continue all previously prescribed medications as directed unless otherwise instructed.  Follow up with your primary care physician in 48-72 hours- bring copies of your results.  Return to the ER for worsening or persistent symptoms, and/or ANY NEW OR CONCERNING SYMPTOMS. If you have issues obtaining follow up, please call: [phone] to obtain a doctor or specialist who takes your insurance in your area.  You may call [phone] to make an appointment with the internal medicine clinic.

## 2025-05-28 NOTE — ED ADULT NURSE NOTE - NSFALLUNIVINTERV_ED_ALL_ED
Bed/Stretcher in lowest position, wheels locked, appropriate side rails in place/Call bell, personal items and telephone in reach/Instruct patient to call for assistance before getting out of bed/chair/stretcher/Non-slip footwear applied when patient is off stretcher/[place] to call system/Physically safe environment - no spills, clutter or unnecessary equipment/Purposeful proactive rounding/Room/bathroom lighting operational, light cord in reach

## 2025-05-28 NOTE — ED PROVIDER NOTE - PHYSICAL EXAMINATION
Well-appearing, well nourished, awake, alert, oriented to person, place, time/situation and in no apparent distress.    Airway patent. Neck supple.    Eyes without scleral injection. No jaundice.    Strong pulse.    Respirations unlabored.    Abdomen soft, non-tender, no guarding.    MSK: TTP L postero-lat neck, worse when turning neck to L. Not hot/swollen/red.    Alert and oriented, no gross motor or sensory deficits.    Skin: normal color for race, warm, dry and intact. No evidence of rash.    No SI/HI.

## 2025-05-28 NOTE — ED PROVIDER NOTE - OBJECTIVE STATEMENT
Jemal: 2 wks atraumatic L neck pain w/ radiating numbness to L occiput and top of L shoulder. Says feels warm and cold, but no fever/sweats/sore throat/swelling. Worse when turns head.

## 2025-05-28 NOTE — ED PROVIDER NOTE - PATIENT PORTAL LINK FT
You can access the FollowMyHealth Patient Portal offered by [facility] by registering at the following website: [email or web address]. By joining [employer]’s FollowMyHealth portal, you will also be able to view your health information using other applications (apps) compatible with our system.